# Patient Record
Sex: FEMALE | Race: WHITE | NOT HISPANIC OR LATINO | Employment: FULL TIME | URBAN - METROPOLITAN AREA
[De-identification: names, ages, dates, MRNs, and addresses within clinical notes are randomized per-mention and may not be internally consistent; named-entity substitution may affect disease eponyms.]

---

## 2023-03-06 LAB
ANION GAP IN SER/PLAS: 12 MMOL/L (ref 10–20)
BASOPHILS (10*3/UL) IN BLOOD BY AUTOMATED COUNT: 0.1 X10E9/L (ref 0–0.1)
BASOPHILS/100 LEUKOCYTES IN BLOOD BY AUTOMATED COUNT: 0.9 % (ref 0–2)
CALCIUM (MG/DL) IN SER/PLAS: 8.4 MG/DL (ref 8.6–10.3)
CARBON DIOXIDE, TOTAL (MMOL/L) IN SER/PLAS: 25 MMOL/L (ref 21–32)
CHLORIDE (MMOL/L) IN SER/PLAS: 109 MMOL/L (ref 98–107)
CREATININE (MG/DL) IN SER/PLAS: 0.81 MG/DL (ref 0.5–1.05)
EOSINOPHILS (10*3/UL) IN BLOOD BY AUTOMATED COUNT: 0.26 X10E9/L (ref 0–0.7)
EOSINOPHILS/100 LEUKOCYTES IN BLOOD BY AUTOMATED COUNT: 2.3 % (ref 0–6)
ERYTHROCYTE DISTRIBUTION WIDTH (RATIO) BY AUTOMATED COUNT: 13.1 % (ref 11.5–14.5)
ERYTHROCYTE MEAN CORPUSCULAR HEMOGLOBIN CONCENTRATION (G/DL) BY AUTOMATED: 32.4 G/DL (ref 32–36)
ERYTHROCYTE MEAN CORPUSCULAR VOLUME (FL) BY AUTOMATED COUNT: 88 FL (ref 80–100)
ERYTHROCYTES (10*6/UL) IN BLOOD BY AUTOMATED COUNT: 4.97 X10E12/L (ref 4–5.2)
GFR FEMALE: 82 ML/MIN/1.73M2
GLUCOSE (MG/DL) IN SER/PLAS: 67 MG/DL (ref 74–99)
HEMATOCRIT (%) IN BLOOD BY AUTOMATED COUNT: 43.8 % (ref 36–46)
HEMOGLOBIN (G/DL) IN BLOOD: 14.2 G/DL (ref 12–16)
IMMATURE GRANULOCYTES/100 LEUKOCYTES IN BLOOD BY AUTOMATED COUNT: 0.4 % (ref 0–0.9)
LEUKOCYTES (10*3/UL) IN BLOOD BY AUTOMATED COUNT: 11.2 X10E9/L (ref 4.4–11.3)
LYMPHOCYTES (10*3/UL) IN BLOOD BY AUTOMATED COUNT: 3.64 X10E9/L (ref 1.2–4.8)
LYMPHOCYTES/100 LEUKOCYTES IN BLOOD BY AUTOMATED COUNT: 32.4 % (ref 13–44)
MONOCYTES (10*3/UL) IN BLOOD BY AUTOMATED COUNT: 0.56 X10E9/L (ref 0.1–1)
MONOCYTES/100 LEUKOCYTES IN BLOOD BY AUTOMATED COUNT: 5 % (ref 2–10)
NEUTROPHILS (10*3/UL) IN BLOOD BY AUTOMATED COUNT: 6.62 X10E9/L (ref 1.2–7.7)
NEUTROPHILS/100 LEUKOCYTES IN BLOOD BY AUTOMATED COUNT: 59 % (ref 40–80)
PLATELETS (10*3/UL) IN BLOOD AUTOMATED COUNT: 361 X10E9/L (ref 150–450)
POTASSIUM (MMOL/L) IN SER/PLAS: 3.7 MMOL/L (ref 3.5–5.3)
SODIUM (MMOL/L) IN SER/PLAS: 142 MMOL/L (ref 136–145)
UREA NITROGEN (MG/DL) IN SER/PLAS: 6 MG/DL (ref 6–23)

## 2023-03-08 LAB — STAPH/MRSA SCREEN, CULTURE: NORMAL

## 2023-03-18 LAB — SARS-COV-2 RESULT: NOT DETECTED

## 2023-03-23 DIAGNOSIS — M54.16 LUMBAR RADICULOPATHY: Primary | ICD-10-CM

## 2023-03-23 RX ORDER — PREGABALIN 50 MG/1
50 CAPSULE ORAL 2 TIMES DAILY
COMMUNITY
Start: 2022-09-07 | End: 2023-03-23 | Stop reason: SDUPTHER

## 2023-03-23 RX ORDER — PREGABALIN 50 MG/1
50 CAPSULE ORAL 2 TIMES DAILY
Qty: 30 CAPSULE | Refills: 0 | Status: SHIPPED | OUTPATIENT
Start: 2023-03-23 | End: 2023-04-17 | Stop reason: SDUPTHER

## 2023-04-17 DIAGNOSIS — M54.16 LUMBAR RADICULOPATHY: ICD-10-CM

## 2023-04-17 RX ORDER — PREGABALIN 50 MG/1
50 CAPSULE ORAL 2 TIMES DAILY
Qty: 30 CAPSULE | Refills: 0 | Status: SHIPPED | OUTPATIENT
Start: 2023-04-17 | End: 2023-05-15 | Stop reason: SDUPTHER

## 2023-04-24 DIAGNOSIS — Z00.00 ROUTINE GENERAL MEDICAL EXAMINATION AT A HEALTH CARE FACILITY: Primary | ICD-10-CM

## 2023-04-24 RX ORDER — TRAZODONE HYDROCHLORIDE 100 MG/1
100 TABLET ORAL NIGHTLY
Qty: 90 TABLET | Refills: 0 | Status: SHIPPED | OUTPATIENT
Start: 2023-04-24 | End: 2023-10-02 | Stop reason: SDUPTHER

## 2023-04-24 RX ORDER — TRAZODONE HYDROCHLORIDE 100 MG/1
1 TABLET ORAL NIGHTLY
COMMUNITY
Start: 2019-01-31 | End: 2023-04-24 | Stop reason: SDUPTHER

## 2023-05-15 DIAGNOSIS — M54.16 LUMBAR RADICULOPATHY: ICD-10-CM

## 2023-05-15 RX ORDER — PREGABALIN 50 MG/1
50 CAPSULE ORAL 2 TIMES DAILY
Qty: 60 CAPSULE | Refills: 3 | Status: SHIPPED | OUTPATIENT
Start: 2023-05-15 | End: 2023-07-17 | Stop reason: SDUPTHER

## 2023-07-13 PROBLEM — F43.0 STRESS REACTION: Status: ACTIVE | Noted: 2023-07-13

## 2023-07-13 PROBLEM — N60.19 FIBROCYSTIC BREAST DISEASE (FCBD): Status: ACTIVE | Noted: 2023-07-13

## 2023-07-13 PROBLEM — M25.569 JOINT PAIN, KNEE: Status: ACTIVE | Noted: 2023-07-13

## 2023-07-13 PROBLEM — M19.079 ARTHRITIS OF MIDFOOT: Status: ACTIVE | Noted: 2023-07-13

## 2023-07-13 PROBLEM — M19.90 OSTEOARTHRITIS: Status: ACTIVE | Noted: 2023-07-13

## 2023-07-13 PROBLEM — R79.89 ELEVATED PARATHYROID HORMONE: Status: ACTIVE | Noted: 2023-07-13

## 2023-07-13 PROBLEM — R92.8 ABNORMAL MAMMOGRAM: Status: ACTIVE | Noted: 2023-07-13

## 2023-07-13 PROBLEM — F17.210 SMOKING GREATER THAN 30 PACK YEARS: Status: ACTIVE | Noted: 2023-07-13

## 2023-07-13 PROBLEM — M70.62 GREATER TROCHANTERIC BURSITIS OF LEFT HIP: Status: ACTIVE | Noted: 2023-07-13

## 2023-07-13 PROBLEM — M23.92 INTERNAL DERANGEMENT OF LEFT KNEE: Status: ACTIVE | Noted: 2023-07-13

## 2023-07-13 PROBLEM — M25.551 HIP PAIN, RIGHT: Status: ACTIVE | Noted: 2023-07-13

## 2023-07-13 PROBLEM — M79.18 MYOFASCIAL PAIN SYNDROME: Status: ACTIVE | Noted: 2023-07-13

## 2023-07-13 PROBLEM — M47.816 LUMBAR SPONDYLOSIS: Status: ACTIVE | Noted: 2023-07-13

## 2023-07-13 PROBLEM — Z96.642 STATUS POST LEFT HIP REPLACEMENT: Status: ACTIVE | Noted: 2023-07-13

## 2023-07-13 PROBLEM — M25.562 LEFT KNEE PAIN: Status: ACTIVE | Noted: 2023-07-13

## 2023-07-13 PROBLEM — M15.9 OSTEOARTHRITIS OF MULTIPLE JOINTS: Status: ACTIVE | Noted: 2023-07-13

## 2023-07-13 PROBLEM — G56.00 CARPAL TUNNEL SYNDROME: Status: ACTIVE | Noted: 2023-07-13

## 2023-07-13 PROBLEM — L98.9 SKIN LESION: Status: ACTIVE | Noted: 2023-07-13

## 2023-07-13 PROBLEM — R20.0 THIGH NUMBNESS: Status: ACTIVE | Noted: 2023-07-13

## 2023-07-13 PROBLEM — F41.9 ANXIETY: Status: ACTIVE | Noted: 2023-07-13

## 2023-07-13 PROBLEM — E55.9 VITAMIN D DEFICIENCY: Status: ACTIVE | Noted: 2023-07-13

## 2023-07-13 PROBLEM — M46.1 SACROILIITIS (CMS-HCC): Status: ACTIVE | Noted: 2023-07-13

## 2023-07-13 PROBLEM — M16.10 PRIMARY LOCALIZED OSTEOARTHROSIS OF PELVIC REGION: Status: ACTIVE | Noted: 2023-07-13

## 2023-07-13 PROBLEM — M70.60 TROCHANTERIC BURSITIS: Status: ACTIVE | Noted: 2023-07-13

## 2023-07-13 PROBLEM — D72.829 ELEVATED WHITE BLOOD CELL COUNT: Status: ACTIVE | Noted: 2023-07-13

## 2023-07-13 PROBLEM — M72.2 PLANTAR FASCIITIS, RIGHT: Status: ACTIVE | Noted: 2023-07-13

## 2023-07-13 PROBLEM — M12.9 ARTHROPATHY: Status: ACTIVE | Noted: 2023-07-13

## 2023-07-13 PROBLEM — M19.079 PRIMARY OSTEOARTHRITIS, UNSPECIFIED ANKLE AND FOOT: Status: ACTIVE | Noted: 2023-07-13

## 2023-07-13 PROBLEM — I10 BENIGN ESSENTIAL HYPERTENSION: Status: ACTIVE | Noted: 2023-07-13

## 2023-07-13 PROBLEM — M84.48XA SACRAL INSUFFICIENCY FRACTURE: Status: ACTIVE | Noted: 2023-07-13

## 2023-07-13 PROBLEM — M54.9 BACK PAIN: Status: ACTIVE | Noted: 2023-07-13

## 2023-07-13 PROBLEM — M17.12 PRIMARY LOCALIZED OSTEOARTHRITIS OF LEFT KNEE: Status: ACTIVE | Noted: 2023-07-13

## 2023-07-13 PROBLEM — M81.0 OSTEOPOROSIS: Status: ACTIVE | Noted: 2023-07-13

## 2023-07-13 PROBLEM — M54.16 LUMBAR RADICULITIS: Status: ACTIVE | Noted: 2023-07-13

## 2023-07-13 PROBLEM — R74.8 ELEVATED ALKALINE PHOSPHATASE LEVEL: Status: ACTIVE | Noted: 2023-07-13

## 2023-07-13 PROBLEM — J44.9 CHRONIC OBSTRUCTIVE PULMONARY DISEASE (MULTI): Status: ACTIVE | Noted: 2023-07-13

## 2023-07-13 PROBLEM — R10.9 RIGHT FLANK PAIN: Status: ACTIVE | Noted: 2023-07-13

## 2023-07-13 PROBLEM — M79.671 RIGHT FOOT PAIN: Status: ACTIVE | Noted: 2023-07-13

## 2023-07-13 PROBLEM — M54.50 LOW BACK PAIN: Status: ACTIVE | Noted: 2023-07-13

## 2023-07-13 PROBLEM — G47.00 INSOMNIA: Status: ACTIVE | Noted: 2023-07-13

## 2023-07-13 PROBLEM — S83.90XA KNEE SPRAIN: Status: ACTIVE | Noted: 2023-07-13

## 2023-07-13 PROBLEM — N39.0 ACUTE UTI: Status: ACTIVE | Noted: 2023-07-13

## 2023-07-17 ENCOUNTER — OFFICE VISIT (OUTPATIENT)
Dept: PRIMARY CARE | Facility: CLINIC | Age: 63
End: 2023-07-17
Payer: COMMERCIAL

## 2023-07-17 VITALS
SYSTOLIC BLOOD PRESSURE: 127 MMHG | HEART RATE: 79 BPM | WEIGHT: 179.4 LBS | DIASTOLIC BLOOD PRESSURE: 81 MMHG | TEMPERATURE: 98 F | OXYGEN SATURATION: 96 % | BODY MASS INDEX: 30.63 KG/M2 | HEIGHT: 64 IN

## 2023-07-17 DIAGNOSIS — M54.6 BILATERAL THORACIC BACK PAIN, UNSPECIFIED CHRONICITY: Primary | ICD-10-CM

## 2023-07-17 DIAGNOSIS — M54.16 LUMBAR RADICULOPATHY: ICD-10-CM

## 2023-07-17 PROBLEM — M25.559 ARTHRALGIA OF HIP: Status: ACTIVE | Noted: 2023-07-17

## 2023-07-17 PROBLEM — M17.10 PRIMARY LOCALIZED OSTEOARTHRITIS OF KNEE: Status: ACTIVE | Noted: 2023-07-17

## 2023-07-17 PROBLEM — M54.50 LOWER BACK PAIN: Status: ACTIVE | Noted: 2023-07-17

## 2023-07-17 PROCEDURE — 3074F SYST BP LT 130 MM HG: CPT | Performed by: INTERNAL MEDICINE

## 2023-07-17 PROCEDURE — 3008F BODY MASS INDEX DOCD: CPT | Performed by: INTERNAL MEDICINE

## 2023-07-17 PROCEDURE — 99214 OFFICE O/P EST MOD 30 MIN: CPT | Performed by: INTERNAL MEDICINE

## 2023-07-17 PROCEDURE — 3079F DIAST BP 80-89 MM HG: CPT | Performed by: INTERNAL MEDICINE

## 2023-07-17 RX ORDER — ALENDRONATE SODIUM 70 MG/1
70 TABLET ORAL
COMMUNITY
Start: 2022-05-18 | End: 2024-01-22 | Stop reason: SDUPTHER

## 2023-07-17 RX ORDER — PREGABALIN 75 MG/1
75 CAPSULE ORAL 2 TIMES DAILY
Qty: 60 CAPSULE | Refills: 3 | Status: SHIPPED | OUTPATIENT
Start: 2023-07-17 | End: 2024-02-05 | Stop reason: SDUPTHER

## 2023-07-17 RX ORDER — TRAMADOL HYDROCHLORIDE 50 MG/1
50 TABLET ORAL
COMMUNITY
Start: 2023-04-05 | End: 2023-10-02 | Stop reason: SDUPTHER

## 2023-07-17 RX ORDER — PHENYLPROPANOLAMINE/CLEMASTINE 75-1.34MG
200 TABLET, EXTENDED RELEASE ORAL AS NEEDED
COMMUNITY
End: 2023-07-17 | Stop reason: ALTCHOICE

## 2023-07-17 RX ORDER — IBUPROFEN 800 MG/1
800 TABLET ORAL EVERY 8 HOURS PRN
COMMUNITY

## 2023-07-17 RX ORDER — LIDOCAINE 50 MG/G
1 PATCH TOPICAL DAILY
COMMUNITY
Start: 2022-08-17

## 2023-07-17 RX ORDER — METHYLPREDNISOLONE 4 MG/1
4 TABLET ORAL
COMMUNITY
Start: 2023-07-12 | End: 2024-03-25 | Stop reason: ALTCHOICE

## 2023-07-17 ASSESSMENT — PATIENT HEALTH QUESTIONNAIRE - PHQ9
SUM OF ALL RESPONSES TO PHQ9 QUESTIONS 1 AND 2: 0
2. FEELING DOWN, DEPRESSED OR HOPELESS: NOT AT ALL
1. LITTLE INTEREST OR PLEASURE IN DOING THINGS: NOT AT ALL

## 2023-07-17 ASSESSMENT — ENCOUNTER SYMPTOMS
BACK PAIN: 1
SLEEP DISTURBANCE: 1

## 2023-07-17 NOTE — PROGRESS NOTES
Subjective   Patient ID: Esmer Upton is a 63 y.o. female who presents for 6 month follow up.    The patient mentions back pain in the thoracic area, particularly on sneezing, since about 7/3/2023.  She recalls an insufficiency fracture in the lumbar region on MRI in 2021.  She has restarted Ibuprofen, and takes 800 mg nightly.  She has stopped taking Aleve as this has not been helping.  She has also been taking pregabalin 50 mg twice daily and has not noticed much benefit.  She does think it was helpful when she first started this.  The patient continues to take Alendronate 70 mg as 1 tablet every week as prescribed.    The patient underwent left total hip replacement surgery in 3/2023 and continues to follow with  from Orthopedic Surgery.  Overall, she feels as though her condition has improved since the procedure.  She recalls a fracture in the left hip joint that was repaired with surgical pinning in 2012 as well.     The patient reports left knee pain and swelling.  She received a corticosteroid and vicosupplementation injection for this joint in 6/2023 which was minimally beneficial.       The patient mentions bilateral foot pain, particularly on the right side.  She has been applying lidocaine patches to the area which has been helping.    The patient reports ongoing sleep disturbance despite taking trazodone 100 mg once nightly, and inquires whether an increased dosage is advisable.    The patient has an upcoming long car ride with her boyfriend to visit her son in [place], and she is concerned about possible blood clotting.      Review of Systems   Musculoskeletal:  Positive for back pain.        Positive for left knee pain and swelling, left hip pain, and bilateral foot pain.   Psychiatric/Behavioral:  Positive for sleep disturbance.      Objective   Physical Exam  Constitutional:       Appearance: Normal appearance.   Cardiovascular:      Rate and Rhythm: Normal rate and regular  rhythm.      Heart sounds: Normal heart sounds.   Pulmonary:      Effort: Pulmonary effort is normal.      Breath sounds: Normal breath sounds.   Abdominal:      General: Bowel sounds are normal.      Palpations: Abdomen is soft.      Tenderness: There is no abdominal tenderness.   Skin:     General: Skin is warm and dry.   Neurological:      General: No focal deficit present.      Mental Status: She is alert and oriented to person, place, and time. Mental status is at baseline.   Psychiatric:         Mood and Affect: Mood normal.         Behavior: Behavior normal.       Assessment/Plan   Problem List Items Addressed This Visit       Back pain - Primary    Relevant Orders    XR thoracic spine 2 views     Other Visit Diagnoses       Lumbar radiculopathy        Relevant Medications    pregabalin (Lyrica) 75 mg capsule            IMPRESSION/PLAN:     Prolonged Car Trip  - Advised the patient to take ASA 81 mg for one week prior, and one week after the trip.    Bilateral Thoracic Back Pain  - Ordered Xray Thoracic Spine.  Patient okay to try applying lidocaine patches to the area. Increased pregabalin to 75 mg twice daily as below.  Ortho prescribed tramadol 50 mg up to TID as needed.  She will use sparingly.  Advised can cause drowsiness if mixing.  OARRS reviewed. Substance controlled contract signed.     Right foot and Bilateral Hip pain   - Increase pregabalin 75 mg BID and advised to limit Ibuprofen as much as possible to prevent adverse effects. Following with  in Orthopedic Surgery.    Insomnia   - Symptoms ongoing.  Advised the patient to try Sleep 3 Nature's Bounty over the counter.  If this does not work, can increase trazodone 100mg 1.5 tablets to be taken QHS one week AFTER starting the increased dosage for pregabalin.      /81 in the office today.     Lumbar Radiculopathy  - Followed with Dr. Liang in Orthopedics, and Dr. Schneider in Pain Medicine.      Left Hip Arthritis   - s/p Total hip  replacement in 3/2023.    Osteoporosis   - Continue with Alendronate 70 mg as 1 tablet every week 30 to 60 minutes before breakfast on an empty stomach. Do not lie down after taking medication.     Carpel Tunnel   - Stable. Continue wearing wrist splints to immobilize while sleeping. Call if symptoms worsen.      History of Smoking   - Last CT Chest 12/2022 showed few stable small bilateral noncalcified pulmonary nodules measuring up to 6 mm, likely benign.  Repeat due 12/2023.     Health Maintenance   - Routine labs 3/2023. Last PAP 6/2021. Last Mammogram 12/2022. Last colonoscopy performed 12/2022, repeat due 12/2025.     Follow up according to routine health maintenance, call sooner if needed.       Scribe Attestation  By signing my name below, I, Poornima Davidson   attest that this documentation has been prepared under the direction and in the presence of Papo Duong DO.

## 2023-10-02 ENCOUNTER — OFFICE VISIT (OUTPATIENT)
Dept: PRIMARY CARE | Facility: CLINIC | Age: 63
End: 2023-10-02
Payer: COMMERCIAL

## 2023-10-02 VITALS
DIASTOLIC BLOOD PRESSURE: 70 MMHG | HEART RATE: 74 BPM | OXYGEN SATURATION: 98 % | RESPIRATION RATE: 16 BRPM | SYSTOLIC BLOOD PRESSURE: 128 MMHG | TEMPERATURE: 97.5 F | HEIGHT: 64 IN | BODY MASS INDEX: 30.9 KG/M2 | WEIGHT: 181 LBS

## 2023-10-02 DIAGNOSIS — I10 BENIGN ESSENTIAL HYPERTENSION: ICD-10-CM

## 2023-10-02 DIAGNOSIS — Z00.00 ROUTINE GENERAL MEDICAL EXAMINATION AT A HEALTH CARE FACILITY: ICD-10-CM

## 2023-10-02 DIAGNOSIS — Z12.31 ENCOUNTER FOR SCREENING MAMMOGRAM FOR MALIGNANT NEOPLASM OF BREAST: ICD-10-CM

## 2023-10-02 DIAGNOSIS — Z00.00 HEALTHCARE MAINTENANCE: Primary | ICD-10-CM

## 2023-10-02 DIAGNOSIS — J44.9 CHRONIC OBSTRUCTIVE PULMONARY DISEASE, UNSPECIFIED COPD TYPE (MULTI): ICD-10-CM

## 2023-10-02 DIAGNOSIS — F17.210 SMOKING GREATER THAN 30 PACK YEARS: ICD-10-CM

## 2023-10-02 DIAGNOSIS — M54.16 LUMBAR RADICULITIS: ICD-10-CM

## 2023-10-02 DIAGNOSIS — M15.9 OSTEOARTHRITIS OF MULTIPLE JOINTS, UNSPECIFIED OSTEOARTHRITIS TYPE: ICD-10-CM

## 2023-10-02 PROCEDURE — 99396 PREV VISIT EST AGE 40-64: CPT | Performed by: INTERNAL MEDICINE

## 2023-10-02 PROCEDURE — 3078F DIAST BP <80 MM HG: CPT | Performed by: INTERNAL MEDICINE

## 2023-10-02 PROCEDURE — 3074F SYST BP LT 130 MM HG: CPT | Performed by: INTERNAL MEDICINE

## 2023-10-02 PROCEDURE — 3008F BODY MASS INDEX DOCD: CPT | Performed by: INTERNAL MEDICINE

## 2023-10-02 RX ORDER — TRAZODONE HYDROCHLORIDE 100 MG/1
100 TABLET ORAL NIGHTLY
Qty: 90 TABLET | Refills: 3 | Status: SHIPPED | OUTPATIENT
Start: 2023-10-02

## 2023-10-02 RX ORDER — TRAMADOL HYDROCHLORIDE 50 MG/1
50 TABLET ORAL EVERY 8 HOURS PRN
Qty: 21 TABLET | Refills: 0 | Status: SHIPPED | OUTPATIENT
Start: 2023-10-02 | End: 2024-03-25 | Stop reason: ALTCHOICE

## 2023-10-02 ASSESSMENT — PROMIS GLOBAL HEALTH SCALE
EMOTIONAL_PROBLEMS: SOMETIMES
RATE_AVERAGE_PAIN: 5
CARRYOUT_PHYSICAL_ACTIVITIES: MOSTLY
CARRYOUT_SOCIAL_ACTIVITIES: GOOD
RATE_AVERAGE_FATIGUE: MILD
RATE_PHYSICAL_HEALTH: FAIR
RATE_QUALITY_OF_LIFE: FAIR
RATE_SOCIAL_SATISFACTION: VERY GOOD
RATE_GENERAL_HEALTH: GOOD
RATE_MENTAL_HEALTH: GOOD

## 2023-10-02 ASSESSMENT — PATIENT HEALTH QUESTIONNAIRE - PHQ9
1. LITTLE INTEREST OR PLEASURE IN DOING THINGS: NOT AT ALL
2. FEELING DOWN, DEPRESSED OR HOPELESS: NOT AT ALL
SUM OF ALL RESPONSES TO PHQ9 QUESTIONS 1 AND 2: 0

## 2023-10-02 ASSESSMENT — ENCOUNTER SYMPTOMS
BLOOD IN STOOL: 0
CONSTIPATION: 0
SLEEP DISTURBANCE: 1
DIARRHEA: 0
BACK PAIN: 1
ABDOMINAL PAIN: 0

## 2023-10-02 NOTE — PROGRESS NOTES
Patient here for a physical     Subjective   Patient ID: Esmer Upton is a 63 y.o. female who presents for Annual Exam.    The patient reports progressively worsening thoracic back pain due to osteoarthritis.  She continues to follow with  from Orthopedics-Spine.    The patient mentions ongoing midfoot pain and notes partial relief with pregabalin 75mg twice daily.  She was recently given a prescription for tramadol 50 mg up to three times daily as needed for a trip to see her son, and found this to be quite helpful.  She continues to follow with  from Orthopedic Surgery.      The patient reports ongoing insomnia despite trying Sleep 3 Melatonin.  She has been able to sleep up to 2 hours at a time in short bursts.    The patient denies any hearing impairment, abdominal pain, hematochezia, melena, or bowel problems.    The patient is up to date with pap smear testing.  She has a history of endometrial ablation in 6/2021, and has not experienced vaginal bleeding since that time.    The patient regularly follows up Optometry and plans to schedule an appointment with an Ophthalmologist soon.      The patient continues to smoke cigarettes.        Review of Systems   HENT:  Negative for hearing loss.    Gastrointestinal:  Negative for abdominal pain, blood in stool, constipation and diarrhea.   Musculoskeletal:  Positive for back pain.        Positive for midfoot pain.   Psychiatric/Behavioral:  Positive for sleep disturbance.        Objective   Physical Exam  Constitutional:       Appearance: Normal appearance.   Neck:      Vascular: No carotid bruit.   Cardiovascular:      Rate and Rhythm: Normal rate and regular rhythm.      Heart sounds: Normal heart sounds.   Pulmonary:      Effort: Pulmonary effort is normal.      Breath sounds: Normal breath sounds.   Abdominal:      General: Bowel sounds are normal.      Palpations: Abdomen is soft.      Tenderness: There is no abdominal tenderness.    Skin:     General: Skin is warm and dry.   Neurological:      General: No focal deficit present.      Mental Status: She is alert and oriented to person, place, and time. Mental status is at baseline.   Psychiatric:         Mood and Affect: Mood normal.         Behavior: Behavior normal.       Assessment/Plan   Problem List Items Addressed This Visit             ICD-10-CM    Benign essential hypertension I10    Chronic obstructive pulmonary disease (CMS/HCC) J44.9    Lumbar radiculitis M54.16    Osteoarthritis of multiple joints M15.9    Relevant Medications    traMADol (Ultram) 50 mg tablet    Other Relevant Orders    Vitamin D 25-Hydroxy,Total (for eval of Vitamin D levels)    Smoking greater than 30 pack years F17.210    Relevant Orders    CT lung screening low dose     Other Visit Diagnoses         Codes    Healthcare maintenance    -  Primary Z00.00    Relevant Orders    CBC and Auto Differential    Comprehensive metabolic panel    Lipid panel    Tsh With Reflex To Free T4 If Abnormal    Encounter for screening mammogram for malignant neoplasm of breast     Z12.31    Relevant Orders    BI mammo bilateral screening tomosynthesis    Routine general medical examination at a health care facility     Z00.00    Relevant Medications    traZODone (Desyrel) 100 mg tablet            CPE Performed  -  Discussed healthy diet and regular exercise.    -  Physical exam overall unremarkable. Immunizations reviewed and updated accordingly. Healthy lifestyle choices discussed (tobacco avoidance, appropriate alcohol use, avoidance of illicit substances).   -  Patient is wearing seatbelt.   -  Screening lab work ordered as indicated.    -  Age appropriate screening tests reviewed with patient.        IMPRESSION/PLAN:     History of Smoking   - Last CT Chest 12/2022 showed few stable small bilateral noncalcified pulmonary nodules measuring up to 6 mm, likely benign.  Ordered repeat for 2023.    Bilateral Thoracic Back Pain  -  Worsening per last Xray 7/2023.  Advised patient to follow-up with  from Orthopedics-Spine or  from Neurosurgery soon.  In the meantime, refilled tramadol 50 mg up to TID as needed.  She will use sparingly. Advised can cause drowsiness if mixing.  OARRS reviewed. Substance controlled contract signed.  Patient also okay to try applying lidocaine patches to the area and continue with pregabalin to 75 mg twice daily as below.     Insomnia   - Symptoms ongoing.  Advised the patient to try Sleep 3 Nature's Bounty over the counter.  Refilled trazodone 100mg at bedtime.    /70 in the office today.     Lumbar Radiculopathy  - Followed with Dr. Liang in Orthopedics, and Dr. Schneider in Pain Medicine.       Left Hip Arthritis   - s/p Total hip replacement in 3/2023.     Osteoporosis   - Continue with Alendronate 70 mg as 1 tablet every week 30 to 60 minutes before breakfast on an empty stomach. Do not lie down after taking medication.     Carpel Tunnel   - Stable. Continue wearing wrist splints to immobilize while sleeping. Call if symptoms worsen.      Right foot and Bilateral Hip pain   - Increase pregabalin 75 mg BID and advised to limit Ibuprofen as much as possible to prevent adverse effects. Following with  in Orthopedic Surgery.     Health Maintenance   - Routine labs ordered including CBC, CMP, and a lipid panel to be completed in the fasting state.  Added TSH, Vitamin D.  Last PAP 6/2021. Last Mammogram 12/2022, ordered repeat for 2023. Last colonoscopy performed 12/2022, repeat due 12/2025.     Follow up according to routine health maintenance, call sooner if needed.       Scribe Attestation  By signing my name below, I, Annetta Linda, Poornima   attest that this documentation has been prepared under the direction and in the presence of Papo Duong DO.

## 2023-10-17 ENCOUNTER — APPOINTMENT (OUTPATIENT)
Dept: PRIMARY CARE | Facility: CLINIC | Age: 63
End: 2023-10-17
Payer: COMMERCIAL

## 2023-10-26 ENCOUNTER — LAB (OUTPATIENT)
Dept: LAB | Facility: LAB | Age: 63
End: 2023-10-26
Payer: COMMERCIAL

## 2023-10-26 ENCOUNTER — OFFICE VISIT (OUTPATIENT)
Dept: ORTHOPEDIC SURGERY | Facility: CLINIC | Age: 63
End: 2023-10-26
Payer: COMMERCIAL

## 2023-10-26 ENCOUNTER — ANCILLARY PROCEDURE (OUTPATIENT)
Dept: RADIOLOGY | Facility: CLINIC | Age: 63
End: 2023-10-26
Payer: COMMERCIAL

## 2023-10-26 DIAGNOSIS — M19.079 PRIMARY OSTEOARTHRITIS, UNSPECIFIED ANKLE AND FOOT: ICD-10-CM

## 2023-10-26 DIAGNOSIS — Z96.642 STATUS POST LEFT HIP REPLACEMENT: ICD-10-CM

## 2023-10-26 DIAGNOSIS — M15.9 OSTEOARTHRITIS OF MULTIPLE JOINTS, UNSPECIFIED OSTEOARTHRITIS TYPE: ICD-10-CM

## 2023-10-26 DIAGNOSIS — Z00.00 HEALTHCARE MAINTENANCE: ICD-10-CM

## 2023-10-26 DIAGNOSIS — M15.9 POLYOSTEOARTHRITIS, UNSPECIFIED: Primary | ICD-10-CM

## 2023-10-26 DIAGNOSIS — Z96.642 STATUS POST LEFT HIP REPLACEMENT: Primary | ICD-10-CM

## 2023-10-26 LAB
25(OH)D3 SERPL-MCNC: 15 NG/ML (ref 30–100)
ALBUMIN SERPL BCP-MCNC: 4.2 G/DL (ref 3.4–5)
ALP SERPL-CCNC: 98 U/L (ref 33–136)
ALT SERPL W P-5'-P-CCNC: 9 U/L (ref 7–45)
ANION GAP SERPL CALC-SCNC: 14 MMOL/L (ref 10–20)
AST SERPL W P-5'-P-CCNC: 13 U/L (ref 9–39)
BASOPHILS # BLD AUTO: 0.08 X10*3/UL (ref 0–0.1)
BASOPHILS NFR BLD AUTO: 0.6 %
BILIRUB SERPL-MCNC: 0.4 MG/DL (ref 0–1.2)
BUN SERPL-MCNC: 11 MG/DL (ref 6–23)
CALCIUM SERPL-MCNC: 9.9 MG/DL (ref 8.6–10.6)
CCP IGG SERPL-ACNC: <1 U/ML
CHLORIDE SERPL-SCNC: 108 MMOL/L (ref 98–107)
CHOLEST SERPL-MCNC: 212 MG/DL (ref 0–199)
CHOLESTEROL/HDL RATIO: 2.9
CO2 SERPL-SCNC: 24 MMOL/L (ref 21–32)
CREAT SERPL-MCNC: 0.82 MG/DL (ref 0.5–1.05)
CRP SERPL-MCNC: 0.11 MG/DL
EOSINOPHIL # BLD AUTO: 0.18 X10*3/UL (ref 0–0.7)
EOSINOPHIL NFR BLD AUTO: 1.3 %
ERYTHROCYTE [DISTWIDTH] IN BLOOD BY AUTOMATED COUNT: 14.3 % (ref 11.5–14.5)
ERYTHROCYTE [SEDIMENTATION RATE] IN BLOOD BY WESTERGREN METHOD: 14 MM/H (ref 0–30)
GFR SERPL CREATININE-BSD FRML MDRD: 81 ML/MIN/1.73M*2
GLUCOSE SERPL-MCNC: 92 MG/DL (ref 74–99)
HCT VFR BLD AUTO: 46.7 % (ref 36–46)
HDLC SERPL-MCNC: 74 MG/DL
HGB BLD-MCNC: 14.8 G/DL (ref 12–16)
IMM GRANULOCYTES # BLD AUTO: 0.08 X10*3/UL (ref 0–0.7)
IMM GRANULOCYTES NFR BLD AUTO: 0.6 % (ref 0–0.9)
LDLC SERPL CALC-MCNC: 123 MG/DL
LYMPHOCYTES # BLD AUTO: 3.38 X10*3/UL (ref 1.2–4.8)
LYMPHOCYTES NFR BLD AUTO: 24.4 %
MCH RBC QN AUTO: 29.7 PG (ref 26–34)
MCHC RBC AUTO-ENTMCNC: 31.7 G/DL (ref 32–36)
MCV RBC AUTO: 94 FL (ref 80–100)
MONOCYTES # BLD AUTO: 0.84 X10*3/UL (ref 0.1–1)
MONOCYTES NFR BLD AUTO: 6.1 %
NEUTROPHILS # BLD AUTO: 9.31 X10*3/UL (ref 1.2–7.7)
NEUTROPHILS NFR BLD AUTO: 67 %
NON HDL CHOLESTEROL: 138 MG/DL (ref 0–149)
NRBC BLD-RTO: 0 /100 WBCS (ref 0–0)
PLATELET # BLD AUTO: 420 X10*3/UL (ref 150–450)
PMV BLD AUTO: 9.6 FL (ref 7.5–11.5)
POTASSIUM SERPL-SCNC: 4.2 MMOL/L (ref 3.5–5.3)
PROT SERPL-MCNC: 6.7 G/DL (ref 6.4–8.2)
RBC # BLD AUTO: 4.98 X10*6/UL (ref 4–5.2)
RHEUMATOID FACT SER NEPH-ACNC: <10 IU/ML (ref 0–15)
SODIUM SERPL-SCNC: 142 MMOL/L (ref 136–145)
TRIGL SERPL-MCNC: 73 MG/DL (ref 0–149)
TSH SERPL-ACNC: 2.9 MIU/L (ref 0.44–3.98)
VLDL: 15 MG/DL (ref 0–40)
WBC # BLD AUTO: 13.9 X10*3/UL (ref 4.4–11.3)

## 2023-10-26 PROCEDURE — 3008F BODY MASS INDEX DOCD: CPT | Performed by: ORTHOPAEDIC SURGERY

## 2023-10-26 PROCEDURE — 99213 OFFICE O/P EST LOW 20 MIN: CPT | Performed by: ORTHOPAEDIC SURGERY

## 2023-10-26 PROCEDURE — 82306 VITAMIN D 25 HYDROXY: CPT

## 2023-10-26 PROCEDURE — 84443 ASSAY THYROID STIM HORMONE: CPT

## 2023-10-26 PROCEDURE — 86431 RHEUMATOID FACTOR QUANT: CPT

## 2023-10-26 PROCEDURE — 86140 C-REACTIVE PROTEIN: CPT

## 2023-10-26 PROCEDURE — 80053 COMPREHEN METABOLIC PANEL: CPT

## 2023-10-26 PROCEDURE — 85025 COMPLETE CBC W/AUTO DIFF WBC: CPT

## 2023-10-26 PROCEDURE — 73502 X-RAY EXAM HIP UNI 2-3 VIEWS: CPT | Mod: LEFT SIDE | Performed by: RADIOLOGY

## 2023-10-26 PROCEDURE — 80061 LIPID PANEL: CPT

## 2023-10-26 PROCEDURE — 73502 X-RAY EXAM HIP UNI 2-3 VIEWS: CPT | Mod: LT,FY

## 2023-10-26 PROCEDURE — 85652 RBC SED RATE AUTOMATED: CPT

## 2023-10-26 PROCEDURE — 86200 CCP ANTIBODY: CPT

## 2023-10-26 RX ORDER — METHYLPREDNISOLONE 4 MG/1
4 TABLET ORAL ONCE
Qty: 1 TABLET | Refills: 0 | Status: SHIPPED | OUTPATIENT
Start: 2023-10-26 | End: 2023-10-26

## 2023-10-26 NOTE — PROGRESS NOTES
Atraumatic onset of left hip pain over the weekend.  Denies any falls.  Pain with walking.  Pain across the groin.  No locking or catching.  No fevers or chills.  No recent dental procedure.    Exam: Good flexibility left hip.  Pain with passive rotation.  Good strength.    I personally reviewed the following radiographic exams: Left hip shows well fixed well aligned total hip.  No change in previous film.    Assessment: Left hip pain status post remote total hip.    Plan: Do not see anything acute.  We will rule out anything infectious with blood work today.  We will place on a Medrol Dosepak.  We will keep her off work for few days.  Discussed possible CT scan to look for fluid if pain continues.

## 2023-10-27 DIAGNOSIS — E55.9 VITAMIN D DEFICIENCY: ICD-10-CM

## 2023-10-27 DIAGNOSIS — D72.829 LEUKOCYTOSIS, UNSPECIFIED TYPE: Primary | ICD-10-CM

## 2023-10-27 RX ORDER — ERGOCALCIFEROL 1.25 MG/1
50000 CAPSULE ORAL
Qty: 12 CAPSULE | Refills: 0 | Status: SHIPPED | OUTPATIENT
Start: 2023-10-27 | End: 2024-01-19

## 2023-12-27 ENCOUNTER — ANCILLARY PROCEDURE (OUTPATIENT)
Dept: RADIOLOGY | Facility: CLINIC | Age: 63
End: 2023-12-27
Payer: COMMERCIAL

## 2023-12-27 ENCOUNTER — LAB (OUTPATIENT)
Dept: LAB | Facility: LAB | Age: 63
End: 2023-12-27
Payer: COMMERCIAL

## 2023-12-27 DIAGNOSIS — F17.210 SMOKING GREATER THAN 30 PACK YEARS: ICD-10-CM

## 2023-12-27 DIAGNOSIS — D72.829 LEUKOCYTOSIS, UNSPECIFIED TYPE: ICD-10-CM

## 2023-12-27 DIAGNOSIS — Z12.31 ENCOUNTER FOR SCREENING MAMMOGRAM FOR MALIGNANT NEOPLASM OF BREAST: ICD-10-CM

## 2023-12-27 LAB
BASOPHILS # BLD AUTO: 0.11 X10*3/UL (ref 0–0.1)
BASOPHILS NFR BLD AUTO: 0.9 %
EOSINOPHIL # BLD AUTO: 0.28 X10*3/UL (ref 0–0.7)
EOSINOPHIL NFR BLD AUTO: 2.4 %
ERYTHROCYTE [DISTWIDTH] IN BLOOD BY AUTOMATED COUNT: 13.3 % (ref 11.5–14.5)
HCT VFR BLD AUTO: 46.7 % (ref 36–46)
HGB BLD-MCNC: 15.1 G/DL (ref 12–16)
IMM GRANULOCYTES # BLD AUTO: 0.04 X10*3/UL (ref 0–0.7)
IMM GRANULOCYTES NFR BLD AUTO: 0.3 % (ref 0–0.9)
LYMPHOCYTES # BLD AUTO: 3.16 X10*3/UL (ref 1.2–4.8)
LYMPHOCYTES NFR BLD AUTO: 26.7 %
MCH RBC QN AUTO: 30 PG (ref 26–34)
MCHC RBC AUTO-ENTMCNC: 32.3 G/DL (ref 32–36)
MCV RBC AUTO: 93 FL (ref 80–100)
MONOCYTES # BLD AUTO: 0.75 X10*3/UL (ref 0.1–1)
MONOCYTES NFR BLD AUTO: 6.3 %
NEUTROPHILS # BLD AUTO: 7.51 X10*3/UL (ref 1.2–7.7)
NEUTROPHILS NFR BLD AUTO: 63.4 %
NRBC BLD-RTO: 0 /100 WBCS (ref 0–0)
PLATELET # BLD AUTO: 433 X10*3/UL (ref 150–450)
RBC # BLD AUTO: 5.04 X10*6/UL (ref 4–5.2)
WBC # BLD AUTO: 11.9 X10*3/UL (ref 4.4–11.3)

## 2023-12-27 PROCEDURE — 85025 COMPLETE CBC W/AUTO DIFF WBC: CPT

## 2023-12-27 PROCEDURE — 71271 CT THORAX LUNG CANCER SCR C-: CPT | Performed by: RADIOLOGY

## 2023-12-27 PROCEDURE — 77067 SCR MAMMO BI INCL CAD: CPT | Mod: BILATERAL PROCEDURE | Performed by: RADIOLOGY

## 2023-12-27 PROCEDURE — 77063 BREAST TOMOSYNTHESIS BI: CPT | Mod: BILATERAL PROCEDURE | Performed by: RADIOLOGY

## 2023-12-27 PROCEDURE — 36415 COLL VENOUS BLD VENIPUNCTURE: CPT

## 2023-12-27 PROCEDURE — 71271 CT THORAX LUNG CANCER SCR C-: CPT

## 2023-12-27 PROCEDURE — 77067 SCR MAMMO BI INCL CAD: CPT

## 2023-12-28 DIAGNOSIS — D72.829 LEUKOCYTOSIS, UNSPECIFIED TYPE: Primary | ICD-10-CM

## 2024-01-22 ENCOUNTER — LAB (OUTPATIENT)
Dept: LAB | Facility: LAB | Age: 64
End: 2024-01-22
Payer: COMMERCIAL

## 2024-01-22 ENCOUNTER — OFFICE VISIT (OUTPATIENT)
Dept: RHEUMATOLOGY | Facility: CLINIC | Age: 64
End: 2024-01-22
Payer: COMMERCIAL

## 2024-01-22 VITALS
WEIGHT: 188 LBS | HEART RATE: 76 BPM | SYSTOLIC BLOOD PRESSURE: 121 MMHG | BODY MASS INDEX: 33.31 KG/M2 | DIASTOLIC BLOOD PRESSURE: 78 MMHG | HEIGHT: 63 IN

## 2024-01-22 DIAGNOSIS — D72.829 LEUKOCYTOSIS, UNSPECIFIED TYPE: ICD-10-CM

## 2024-01-22 DIAGNOSIS — M81.0 OSTEOPOROSIS, UNSPECIFIED OSTEOPOROSIS TYPE, UNSPECIFIED PATHOLOGICAL FRACTURE PRESENCE: ICD-10-CM

## 2024-01-22 DIAGNOSIS — M81.0 OSTEOPOROSIS, UNSPECIFIED OSTEOPOROSIS TYPE, UNSPECIFIED PATHOLOGICAL FRACTURE PRESENCE: Primary | ICD-10-CM

## 2024-01-22 DIAGNOSIS — M15.9 OSTEOARTHRITIS OF MULTIPLE JOINTS, UNSPECIFIED OSTEOARTHRITIS TYPE: ICD-10-CM

## 2024-01-22 LAB
25(OH)D3 SERPL-MCNC: 61 NG/ML (ref 30–100)
ANION GAP SERPL CALC-SCNC: 18 MMOL/L (ref 10–20)
BASOPHILS # BLD AUTO: 0.09 X10*3/UL (ref 0–0.1)
BASOPHILS NFR BLD AUTO: 0.9 %
BUN SERPL-MCNC: 7 MG/DL (ref 6–23)
CALCIUM SERPL-MCNC: 9.9 MG/DL (ref 8.6–10.6)
CHLORIDE SERPL-SCNC: 108 MMOL/L (ref 98–107)
CO2 SERPL-SCNC: 22 MMOL/L (ref 21–32)
CREAT SERPL-MCNC: 0.82 MG/DL (ref 0.5–1.05)
EGFRCR SERPLBLD CKD-EPI 2021: 81 ML/MIN/1.73M*2
EOSINOPHIL # BLD AUTO: 0.34 X10*3/UL (ref 0–0.7)
EOSINOPHIL NFR BLD AUTO: 3.2 %
ERYTHROCYTE [DISTWIDTH] IN BLOOD BY AUTOMATED COUNT: 13 % (ref 11.5–14.5)
GLUCOSE SERPL-MCNC: 83 MG/DL (ref 74–99)
HCT VFR BLD AUTO: 45.8 % (ref 36–46)
HGB BLD-MCNC: 14.5 G/DL (ref 12–16)
IMM GRANULOCYTES # BLD AUTO: 0.06 X10*3/UL (ref 0–0.7)
IMM GRANULOCYTES NFR BLD AUTO: 0.6 % (ref 0–0.9)
LYMPHOCYTES # BLD AUTO: 3.15 X10*3/UL (ref 1.2–4.8)
LYMPHOCYTES NFR BLD AUTO: 29.8 %
MCH RBC QN AUTO: 28.9 PG (ref 26–34)
MCHC RBC AUTO-ENTMCNC: 31.7 G/DL (ref 32–36)
MCV RBC AUTO: 91 FL (ref 80–100)
MONOCYTES # BLD AUTO: 0.62 X10*3/UL (ref 0.1–1)
MONOCYTES NFR BLD AUTO: 5.9 %
NEUTROPHILS # BLD AUTO: 6.31 X10*3/UL (ref 1.2–7.7)
NEUTROPHILS NFR BLD AUTO: 59.6 %
NRBC BLD-RTO: 0 /100 WBCS (ref 0–0)
PLATELET # BLD AUTO: 408 X10*3/UL (ref 150–450)
POTASSIUM SERPL-SCNC: 4.1 MMOL/L (ref 3.5–5.3)
RBC # BLD AUTO: 5.02 X10*6/UL (ref 4–5.2)
SODIUM SERPL-SCNC: 144 MMOL/L (ref 136–145)
WBC # BLD AUTO: 10.6 X10*3/UL (ref 4.4–11.3)

## 2024-01-22 PROCEDURE — 80048 BASIC METABOLIC PNL TOTAL CA: CPT

## 2024-01-22 PROCEDURE — 3074F SYST BP LT 130 MM HG: CPT | Performed by: INTERNAL MEDICINE

## 2024-01-22 PROCEDURE — 36415 COLL VENOUS BLD VENIPUNCTURE: CPT

## 2024-01-22 PROCEDURE — 3078F DIAST BP <80 MM HG: CPT | Performed by: INTERNAL MEDICINE

## 2024-01-22 PROCEDURE — 99213 OFFICE O/P EST LOW 20 MIN: CPT | Performed by: INTERNAL MEDICINE

## 2024-01-22 PROCEDURE — 82306 VITAMIN D 25 HYDROXY: CPT

## 2024-01-22 PROCEDURE — 3008F BODY MASS INDEX DOCD: CPT | Performed by: INTERNAL MEDICINE

## 2024-01-22 PROCEDURE — 85025 COMPLETE CBC W/AUTO DIFF WBC: CPT

## 2024-01-22 RX ORDER — ALENDRONATE SODIUM 70 MG/1
70 TABLET ORAL
Qty: 12 TABLET | Refills: 0 | Status: SHIPPED | OUTPATIENT
Start: 2024-01-22 | End: 2024-04-09

## 2024-01-22 NOTE — PROGRESS NOTES
Follow-up Rheumatology Patient Visit    Chief Complaint:  Esmer Upton is a 63 y.o. female presenting today for Follow-up.    History of Presenting Problem:   64 y/o female with various joint pain present for evaluation. She report she has had various joint issues in the past, she also report Hx of right Hip Fracture with no Trauma. She also report a sacral insufficiency.Her worst issues is her bilateral feet pain.  Today she reported she is tolerating alendronate weekly  She has been started on Lyrica by her orthopedic for chronic feet pain.  S/p Left hip replacement in March 2023.  She takes ibuprofen intermittently for joint pain   Problem List:   Patient Active Problem List   Diagnosis    Abnormal mammogram    Acute UTI    Anxiety    Arthritis of midfoot    Benign essential hypertension    Carpal tunnel syndrome    Chronic obstructive pulmonary disease (CMS/HCC)    Elevated alkaline phosphatase level    Elevated parathyroid hormone    Elevated white blood cell count    Fibrocystic breast disease (FCBD)    Insomnia    Internal derangement of left knee    Joint pain, knee    Knee sprain    Left knee pain    Lumbar radiculitis    Lumbar spondylosis    Myofascial pain syndrome    Osteoporosis    Plantar fasciitis, right    Arthropathy    Osteoarthritis of multiple joints    Osteoarthritis    Primary localized osteoarthritis of left knee    Primary osteoarthritis, unspecified ankle and foot    Primary localized osteoarthrosis of pelvic region    Right flank pain    Hip pain, right    Right foot pain    Sacral insufficiency fracture    Sacroiliitis (CMS/HCC)    Skin lesion    Smoking greater than 30 pack years    Status post left hip replacement    Stress reaction    Thigh numbness    Back pain    Greater trochanteric bursitis of left hip    Low back pain    Trochanteric bursitis    Vitamin D deficiency    Primary localized osteoarthritis of knee    Arthralgia of hip    Lower back pain       Past Medical  History:   Past Medical History:   Diagnosis Date    Personal history of other diseases of the nervous system and sense organs 10/17/2016    History of acute otitis media    Personal history of other diseases of the respiratory system 12/19/2019    History of sinusitis    Trochanteric bursitis, unspecified hip 01/07/2014    Trochanteric bursitis    Urinary tract infection, site not specified 12/11/2015    Bacterial UTI       Surgical History:   Past Surgical History:   Procedure Laterality Date    COLONOSCOPY  11/12/2012    Complete Colonoscopy    FOOT SURGERY  03/04/2016    Foot Surgery    GALLBLADDER SURGERY  11/12/2012    Gallbladder Surgery    HIP SURGERY  01/07/2014    Hip Surgery    HIP SURGERY  01/07/2013    Hip Surgery    HIP SURGERY  01/07/2013    Hip Surgery    OTHER SURGICAL HISTORY  06/24/2021    Endometrial ablation    XR CHEST PACEMAKER WITH FLUORO  10/31/2012    XR CHEST PACEMAKER WITH FLUORO 10/31/2012 [facility]        Allergies: No Known Allergies    Medications:   Current Outpatient Medications:     alendronate (Fosamax) 70 mg tablet, Take 1 tablet (70 mg) by mouth every 7 days. TAKE 1 TABLET BY MOUTH EVERY WEEK 30 TO 60 MINUTES PRIOR TO BREAKFASTON AN EMPTY STOMACH. DO NOT LIE DOWN AFTER TAKING MEDICATION, Disp: , Rfl:     ibuprofen 800 mg tablet, Take 1 tablet (800 mg) by mouth every 8 hours if needed for mild pain (1 - 3)., Disp: , Rfl:     lidocaine (Lidoderm) 5 % patch, Place 1 patch on the skin once daily. APPLY 1 PATCH TO THE AFFECTED AREA AND LEAVE IN PLACE FOR 12 HOURS, THEN REMOVE AND LEAVE OFF FOR 12 HOURS., Disp: , Rfl:     methylPREDNISolone (Medrol Dospak) 4 mg tablets, Take 1 tablet (4 mg) by mouth. follow instructions on pack, Disp: , Rfl:     pregabalin (Lyrica) 75 mg capsule, Take 1 capsule (75 mg) by mouth 2 times a day., Disp: 60 capsule, Rfl: 3    traMADol (Ultram) 50 mg tablet, Take 1 tablet (50 mg) by mouth every 8 hours if needed for severe pain (7 - 10). 1 TABLET  "PO EVERY 8 HOURS, Disp: 21 tablet, Rfl: 0    traZODone (Desyrel) 100 mg tablet, Take 1 tablet (100 mg) by mouth once daily at bedtime., Disp: 90 tablet, Rfl: 3      Objective   Physical Examination:    Visit Vitals  /78   Pulse 76   Ht 1.6 m (5' 3\")   Wt 85.3 kg (188 lb)   BMI 33.30 kg/m²   OB Status Postmenopausal   Smoking Status Every Day   BSA 1.95 m²        Gen: NAD, A&O x 3  HEENT: clear sclera and conjunctiva,     Musculoskeletal:   Neck; WNL, full ROM  Shoulder: WNL, full ROM  Elbow:WNL, full ROM, no effusion noted  Wrist and fingers;no active synovitis noted, Full ROM in the Wrist , Good fist and   Knees:  No effusions or crepitation, full ROM.  Hips; WNL, full ROM, Negative Dale test  Ankle, Feet; WNL, full ROM    Skin: No rashes or lesions seen, no nail changes  Neuro: A&O x3, Normal Gait    Procedures :None    Orders:  Orders Placed This Encounter   Procedures    XR DEXA bone density    Vitamin D 25-Hydroxy,Total (for eval of Vitamin D levels)    Basic Metabolic Panel        Provider Impression:   Assessment/Plan   Encounter Diagnoses   Name Primary?    Osteoporosis, unspecified osteoporosis type, unspecified pathological fracture presence Yes    Osteoarthritis of multiple joints, unspecified osteoarthritis type       62 y/o female with various joint pain present for evaluation. She report she has had various joint issues in the past, she also report Hx of right Hip Fracture with no Trauma. She also report a sacral insufficiency. X-rays of the foot shows degenerative changes and calcaneal spurs. Her bone density shows that she has osteopenia worse in the lumbar spine. She is noted to have a chronic elevated alkaline phosphatase and a low vitamin D  Review of her imaging and laboratory work-up does not show underlying rheumatoid arthritis of juvenile rheumatoid arthritis she mostly has OA changes.   -Stop Celebrex while she is taking Ibuprofen with recent Hip surgery and worsening knee pain "   -Continue current management with orthopedics  -Continue with vitamin D and also calcium supplement   -Conitnue with alendronate 70 mg weekly vitamin D has improved. Repeat DXA in May 2024  -Follow-up in 6 months

## 2024-02-05 DIAGNOSIS — M54.16 LUMBAR RADICULOPATHY: ICD-10-CM

## 2024-02-05 RX ORDER — PREGABALIN 75 MG/1
75 CAPSULE ORAL 2 TIMES DAILY
Qty: 60 CAPSULE | Refills: 3 | Status: SHIPPED | OUTPATIENT
Start: 2024-02-05

## 2024-03-25 ENCOUNTER — OFFICE VISIT (OUTPATIENT)
Dept: PRIMARY CARE | Facility: CLINIC | Age: 64
End: 2024-03-25
Payer: COMMERCIAL

## 2024-03-25 VITALS
HEART RATE: 84 BPM | TEMPERATURE: 97.7 F | SYSTOLIC BLOOD PRESSURE: 128 MMHG | OXYGEN SATURATION: 100 % | WEIGHT: 191 LBS | DIASTOLIC BLOOD PRESSURE: 70 MMHG | RESPIRATION RATE: 16 BRPM | BODY MASS INDEX: 33.83 KG/M2

## 2024-03-25 DIAGNOSIS — J44.9 CHRONIC OBSTRUCTIVE PULMONARY DISEASE, UNSPECIFIED COPD TYPE (MULTI): ICD-10-CM

## 2024-03-25 PROCEDURE — 3074F SYST BP LT 130 MM HG: CPT | Performed by: INTERNAL MEDICINE

## 2024-03-25 PROCEDURE — 3078F DIAST BP <80 MM HG: CPT | Performed by: INTERNAL MEDICINE

## 2024-03-25 PROCEDURE — 99213 OFFICE O/P EST LOW 20 MIN: CPT | Performed by: INTERNAL MEDICINE

## 2024-03-25 ASSESSMENT — ENCOUNTER SYMPTOMS
BACK PAIN: 1
SLEEP DISTURBANCE: 1

## 2024-04-01 ENCOUNTER — APPOINTMENT (OUTPATIENT)
Dept: PRIMARY CARE | Facility: CLINIC | Age: 64
End: 2024-04-01
Payer: COMMERCIAL

## 2024-05-06 ENCOUNTER — APPOINTMENT (OUTPATIENT)
Dept: ORTHOPEDIC SURGERY | Facility: CLINIC | Age: 64
End: 2024-05-06
Payer: COMMERCIAL

## 2024-05-22 ENCOUNTER — OFFICE VISIT (OUTPATIENT)
Dept: ORTHOPEDIC SURGERY | Facility: CLINIC | Age: 64
End: 2024-05-22
Payer: COMMERCIAL

## 2024-05-22 ENCOUNTER — HOSPITAL ENCOUNTER (OUTPATIENT)
Dept: RADIOLOGY | Facility: CLINIC | Age: 64
Discharge: HOME | End: 2024-05-22
Payer: COMMERCIAL

## 2024-05-22 DIAGNOSIS — M72.2 PLANTAR FIBROMATOSIS: ICD-10-CM

## 2024-05-22 DIAGNOSIS — L84 PLANTAR CALLOSITY: Primary | ICD-10-CM

## 2024-05-22 DIAGNOSIS — M79.671 RIGHT FOOT PAIN: ICD-10-CM

## 2024-05-22 PROCEDURE — 73630 X-RAY EXAM OF FOOT: CPT | Mod: RIGHT SIDE | Performed by: RADIOLOGY

## 2024-05-22 PROCEDURE — 73630 X-RAY EXAM OF FOOT: CPT | Mod: RT

## 2024-05-22 PROCEDURE — 99213 OFFICE O/P EST LOW 20 MIN: CPT | Performed by: ORTHOPAEDIC SURGERY

## 2024-05-22 PROCEDURE — 20600 DRAIN/INJ JOINT/BURSA W/O US: CPT | Performed by: ORTHOPAEDIC SURGERY

## 2024-05-22 RX ORDER — METHYLPREDNISOLONE ACETATE 40 MG/ML
40 INJECTION, SUSPENSION INTRA-ARTICULAR; INTRALESIONAL; INTRAMUSCULAR; SOFT TISSUE
Status: COMPLETED | OUTPATIENT
Start: 2024-05-22 | End: 2024-05-22

## 2024-05-22 RX ORDER — LIDOCAINE HYDROCHLORIDE 20 MG/ML
1 INJECTION, SOLUTION INFILTRATION; PERINEURAL
Status: COMPLETED | OUTPATIENT
Start: 2024-05-22 | End: 2024-05-22

## 2024-05-22 RX ADMIN — METHYLPREDNISOLONE ACETATE 40 MG: 40 INJECTION, SUSPENSION INTRA-ARTICULAR; INTRALESIONAL; INTRAMUSCULAR; SOFT TISSUE at 09:04

## 2024-05-22 RX ADMIN — LIDOCAINE HYDROCHLORIDE 1 ML: 20 INJECTION, SOLUTION INFILTRATION; PERINEURAL at 09:04

## 2024-05-22 ASSESSMENT — PAIN DESCRIPTION - DESCRIPTORS: DESCRIPTORS: ACHING;BURNING;SHARP;SHOOTING;THROBBING

## 2024-05-22 ASSESSMENT — PAIN - FUNCTIONAL ASSESSMENT: PAIN_FUNCTIONAL_ASSESSMENT: 0-10

## 2024-05-22 ASSESSMENT — PAIN SCALES - GENERAL: PAINLEVEL_OUTOF10: 2

## 2024-05-22 NOTE — PROGRESS NOTES
Returns for right foot.  The painful calluses over the lateral aspect of the fifth metatarsal and under the plantar aspect.  Been going on about 8 months.  Has tried shaving it down on her own.  Is also had a painful lump along the medial arch.  Is going on about 5 months.  Denies any injury.  Regular shoewear.    Exam: Tender swelling along medial arch along medial plantar fascia with small nodule.  No pain first TMT joint.  No palpable spur.  Has plantar and lateral callosity around fifth metatarsal head and around fourth metatarsal head.  No obvious viral inclusion.    I personally reviewed the following radiographic exams: Right foot shows some midfoot arthritis without change.  Reasonable first TMT joint.  Small spur off the lateral head.  No abnormality of the lesser metatarsal heads.    Assessment: Callosities right foot.  Tender plantar fibroma medial plantar fascia.    Plan: Discussed nonoperative and operative options in detail.   Risk and benefits discussed in detail. All questions answered today.  Recovery timeline and expectations discussed in detail.  Recommend evaluation by podiatry for plantar callosities or for 1 more treatment.  Do not see any bony issue from a surgical standpoint.  Offered cortisone injection around the tender fibroma.  Consider advanced imaging.  After informed consent under sterile conditions the right plantar fibroma was injected with 1 cc total, a combination of  2% lidocaine and 40mg Methylprednisolone.  Risks and benefits were discussed in detail.  Patient ID: Esmer Upton is a 64 y.o. female.    S Inj/Asp plantar fibroma on 5/22/2024 9:04 AM  Indications: pain  Details: 25 G needle  Medications: 40 mg methylPREDNISolone acetate 40 mg/mL; 1 mL lidocaine 20 mg/mL (2 %)

## 2024-06-03 ENCOUNTER — HOSPITAL ENCOUNTER (OUTPATIENT)
Dept: RADIOLOGY | Facility: CLINIC | Age: 64
Discharge: HOME | End: 2024-06-03
Payer: COMMERCIAL

## 2024-06-03 DIAGNOSIS — M81.0 OSTEOPOROSIS, UNSPECIFIED OSTEOPOROSIS TYPE, UNSPECIFIED PATHOLOGICAL FRACTURE PRESENCE: ICD-10-CM

## 2024-06-03 PROCEDURE — 77080 DXA BONE DENSITY AXIAL: CPT

## 2024-07-01 DIAGNOSIS — M81.0 OSTEOPOROSIS, UNSPECIFIED OSTEOPOROSIS TYPE, UNSPECIFIED PATHOLOGICAL FRACTURE PRESENCE: ICD-10-CM

## 2024-07-01 RX ORDER — ALENDRONATE SODIUM 70 MG/1
70 TABLET ORAL
Qty: 12 TABLET | Refills: 0 | Status: SHIPPED | OUTPATIENT
Start: 2024-07-01 | End: 2024-09-17

## 2024-07-22 ENCOUNTER — APPOINTMENT (OUTPATIENT)
Dept: RHEUMATOLOGY | Facility: CLINIC | Age: 64
End: 2024-07-22
Payer: COMMERCIAL

## 2024-08-02 DIAGNOSIS — M54.16 LUMBAR RADICULOPATHY: ICD-10-CM

## 2024-08-02 RX ORDER — PREGABALIN 75 MG/1
75 CAPSULE ORAL 2 TIMES DAILY
Qty: 60 CAPSULE | Refills: 3 | Status: SHIPPED | OUTPATIENT
Start: 2024-08-02

## 2024-08-12 ENCOUNTER — APPOINTMENT (OUTPATIENT)
Dept: RHEUMATOLOGY | Facility: CLINIC | Age: 64
End: 2024-08-12
Payer: COMMERCIAL

## 2024-08-12 VITALS
HEART RATE: 79 BPM | BODY MASS INDEX: 34.54 KG/M2 | WEIGHT: 195 LBS | SYSTOLIC BLOOD PRESSURE: 123 MMHG | DIASTOLIC BLOOD PRESSURE: 83 MMHG

## 2024-08-12 DIAGNOSIS — M81.0 OSTEOPOROSIS, UNSPECIFIED OSTEOPOROSIS TYPE, UNSPECIFIED PATHOLOGICAL FRACTURE PRESENCE: Primary | ICD-10-CM

## 2024-08-12 DIAGNOSIS — M15.9 OSTEOARTHRITIS OF MULTIPLE JOINTS, UNSPECIFIED OSTEOARTHRITIS TYPE: ICD-10-CM

## 2024-08-12 PROCEDURE — 99214 OFFICE O/P EST MOD 30 MIN: CPT | Performed by: INTERNAL MEDICINE

## 2024-08-12 PROCEDURE — 3079F DIAST BP 80-89 MM HG: CPT | Performed by: INTERNAL MEDICINE

## 2024-08-12 PROCEDURE — 3074F SYST BP LT 130 MM HG: CPT | Performed by: INTERNAL MEDICINE

## 2024-08-12 RX ORDER — CYCLOBENZAPRINE HCL 10 MG
1 TABLET ORAL EVERY 8 HOURS PRN
COMMUNITY
Start: 2024-07-01

## 2024-08-12 RX ORDER — MELOXICAM 15 MG/1
1 TABLET ORAL
COMMUNITY
Start: 2024-07-15

## 2024-08-12 RX ORDER — TERBINAFINE HYDROCHLORIDE 250 MG/1
250 TABLET ORAL DAILY
COMMUNITY
Start: 2024-05-28

## 2024-08-12 NOTE — PROGRESS NOTES
Follow-up Rheumatology Patient Visit    Chief Complaint:  Esmer Upton is a 64 y.o. female presenting today for Follow-up.    History of Presenting Problem:   65 y/o female with various joint pain present for evaluation. She report she has had various joint issues in the past, she also report Hx of right Hip Fracture with no Trauma. She also report a sacral insufficiency.Her worst issues is her bilateral feet pain.  Today she reported she is tolerating alendronate weekly  She has been started on Lyrica by her orthopedic for chronic feet pain.  S/p Left hip replacement in March 2023.    She is currently on Meloxicam per her foot doctor which seem to be helping. She is dealing with chronic lowe back pain, worse with standing.She did dee dee pain management and injection did not help.   Problem List:   Patient Active Problem List   Diagnosis    Abnormal mammogram    Acute UTI    Anxiety    Arthritis of midfoot    Benign essential hypertension    Carpal tunnel syndrome    Chronic obstructive pulmonary disease (Multi)    Elevated alkaline phosphatase level    Elevated parathyroid hormone    Elevated white blood cell count    Fibrocystic breast disease (FCBD)    Insomnia    Internal derangement of left knee    Joint pain, knee    Knee sprain    Left knee pain    Lumbar radiculitis    Lumbar spondylosis    Myofascial pain syndrome    Osteoporosis    Plantar fasciitis, right    Arthropathy    Osteoarthritis of multiple joints    Osteoarthritis    Primary localized osteoarthritis of left knee    Primary osteoarthritis, unspecified ankle and foot    Primary localized osteoarthrosis of pelvic region    Right flank pain    Hip pain, right    Right foot pain    Sacral insufficiency fracture    Sacroiliitis (CMS-HCC)    Skin lesion    Smoking greater than 30 pack years    Status post left hip replacement    Stress reaction    Thigh numbness    Back pain    Greater trochanteric bursitis of left hip    Low back pain     Trochanteric bursitis    Vitamin D deficiency    Primary localized osteoarthritis of knee    Arthralgia of hip    Lower back pain       Past Medical History:   Past Medical History:   Diagnosis Date    Personal history of other diseases of the nervous system and sense organs 10/17/2016    History of acute otitis media    Personal history of other diseases of the respiratory system 12/19/2019    History of sinusitis    Trochanteric bursitis, unspecified hip 01/07/2014    Trochanteric bursitis    Urinary tract infection, site not specified 12/11/2015    Bacterial UTI       Surgical History:   Past Surgical History:   Procedure Laterality Date    COLONOSCOPY  11/12/2012    Complete Colonoscopy    FOOT SURGERY  03/04/2016    Foot Surgery    GALLBLADDER SURGERY  11/12/2012    Gallbladder Surgery    HIP SURGERY  01/07/2014    Hip Surgery    HIP SURGERY  01/07/2013    Hip Surgery    HIP SURGERY  01/07/2013    Hip Surgery    OTHER SURGICAL HISTORY  06/24/2021    Endometrial ablation    XR CHEST PACEMAKER WITH FLUORO  10/31/2012    XR CHEST PACEMAKER WITH FLUORO 10/31/2012 [facility]        Allergies: No Known Allergies    Medications:   Current Outpatient Medications:     alendronate (Fosamax) 70 mg tablet, Take 1 tablet (70 mg) by mouth every 7 days for 12 doses. TAKE 1 TABLET BY MOUTH EVERY WEEK 30 TO 60 MINUTES PRIOR TO BREAKFASTON AN EMPTY STOMACH. DO NOT LIE DOWN AFTER TAKING MEDICATION, Disp: 12 tablet, Rfl: 0    cyclobenzaprine (Flexeril) 10 mg tablet, Take 1 tablet (10 mg) by mouth every 8 hours if needed for pain., Disp: , Rfl:     ibuprofen 800 mg tablet, Take 1 tablet (800 mg) by mouth every 8 hours if needed for mild pain (1 - 3)., Disp: , Rfl:     lidocaine (Lidoderm) 5 % patch, Place 1 patch on the skin once daily. APPLY 1 PATCH TO THE AFFECTED AREA AND LEAVE IN PLACE FOR 12 HOURS, THEN REMOVE AND LEAVE OFF FOR 12 HOURS., Disp: , Rfl:     meloxicam (Mobic) 15 mg tablet, Take 1 tablet (15 mg) by mouth  early in the morning.., Disp: , Rfl:     pregabalin (Lyrica) 75 mg capsule, Take 1 capsule (75 mg) by mouth 2 times a day., Disp: 60 capsule, Rfl: 3    terbinafine (LamISIL) 250 mg tablet, Take 1 tablet (250 mg) by mouth once daily., Disp: , Rfl:     traZODone (Desyrel) 100 mg tablet, Take 1 tablet (100 mg) by mouth once daily at bedtime., Disp: 90 tablet, Rfl: 3      Objective   Physical Examination:    Visit Vitals  /83   Pulse 79   Wt 88.5 kg (195 lb)   BMI 34.54 kg/m²   OB Status Postmenopausal   Smoking Status Every Day   BSA 1.98 m²        Gen: NAD, A&O x 3  HEENT: clear sclera and conjunctiva,     Musculoskeletal:   Neck; WNL, full ROM  Shoulder: WNL, full ROM  Elbow:WNL, full ROM, no effusion noted  Wrist and fingers;no active synovitis noted, Full ROM in the Wrist , Good fist and   Knees:  No effusions or crepitation, full ROM.  Hips; WNL, full ROM, Negative Dale test  Ankle, Feet; WNL, full ROM    Skin: No rashes or lesions seen, no nail changes  Neuro: A&O x3, Normal Gait    Procedures :None    Orders:  No orders of the defined types were placed in this encounter.       Provider Impression:   Assessment/Plan   Encounter Diagnoses   Name Primary?    Osteoporosis, unspecified osteoporosis type, unspecified pathological fracture presence Yes    Osteoarthritis of multiple joints, unspecified osteoarthritis type         63 y/o female with various joint pain present for evaluation. She report she has had various joint issues in the past, she also report Hx of right Hip Fracture with no Trauma. She also report a sacral insufficiency. X-rays of the foot shows degenerative changes and calcaneal spurs. Her bone density shows that she has osteopenia worse in the lumbar spine. She is noted to have a chronic elevated alkaline phosphatase and a low vitamin D  Review of her imaging and laboratory work-up does not show underlying rheumatoid arthritis of juvenile rheumatoid arthritis she mostly has OA  changes.   -Continue current management with orthopedics  -Continue with vitamin D and also calcium supplement   -Conitnue with alendronate 70 mg weekly vitamin D. Her Tsocre June 2024 show total right Hip score is -1.1, neck is -1.2, keep on the same  Repeat DXA in May 2026  -Follow-up in 12 months

## 2024-08-16 ENCOUNTER — APPOINTMENT (OUTPATIENT)
Dept: NEUROSURGERY | Facility: CLINIC | Age: 64
End: 2024-08-16
Payer: COMMERCIAL

## 2024-08-16 VITALS
SYSTOLIC BLOOD PRESSURE: 118 MMHG | HEIGHT: 63 IN | BODY MASS INDEX: 34.66 KG/M2 | HEART RATE: 87 BPM | DIASTOLIC BLOOD PRESSURE: 70 MMHG | WEIGHT: 195.6 LBS

## 2024-08-16 DIAGNOSIS — G89.29 CHRONIC MIDLINE THORACIC BACK PAIN: Primary | ICD-10-CM

## 2024-08-16 DIAGNOSIS — M54.6 CHRONIC MIDLINE THORACIC BACK PAIN: Primary | ICD-10-CM

## 2024-08-16 PROCEDURE — 99203 OFFICE O/P NEW LOW 30 MIN: CPT | Performed by: NURSE PRACTITIONER

## 2024-08-16 PROCEDURE — 3074F SYST BP LT 130 MM HG: CPT | Performed by: NURSE PRACTITIONER

## 2024-08-16 PROCEDURE — 3008F BODY MASS INDEX DOCD: CPT | Performed by: NURSE PRACTITIONER

## 2024-08-16 PROCEDURE — 3078F DIAST BP <80 MM HG: CPT | Performed by: NURSE PRACTITIONER

## 2024-08-16 ASSESSMENT — PAIN SCALES - GENERAL: PAINLEVEL: 4

## 2024-08-16 ASSESSMENT — PATIENT HEALTH QUESTIONNAIRE - PHQ9
10. IF YOU CHECKED OFF ANY PROBLEMS, HOW DIFFICULT HAVE THESE PROBLEMS MADE IT FOR YOU TO DO YOUR WORK, TAKE CARE OF THINGS AT HOME, OR GET ALONG WITH OTHER PEOPLE: SOMEWHAT DIFFICULT
SUM OF ALL RESPONSES TO PHQ9 QUESTIONS 1 AND 2: 1
2. FEELING DOWN, DEPRESSED OR HOPELESS: NOT AT ALL
1. LITTLE INTEREST OR PLEASURE IN DOING THINGS: SEVERAL DAYS

## 2024-08-16 NOTE — PROGRESS NOTES
"It was a pleasure to see Esmer Upton on 8/16/2024. She is a 64 y.o. year old female who presents to the [facility] for evaluation of mid back pain. Patient is referred by No ref. provider found. PMH is significant for HTN / HPL, OA, OP on alendronate (h/o sacral insufficiency fx, non-traumatic hip fx), chronic LBP. She lives at home with 3 cats. 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 Most recent T Spine imaging reviewed from CT Lungs in 12/2023, without note of thoracic fracture; presence of anterior osteophytes noted; T 7 - 8 disc osteophyte complex noted to my interpretation. T Spine x-rays 07/18/2023, reviewed with note of levoscoliosis.  Progression of symptoms prompted today's visit. Thus far, patient has tried activity adjustment with little to no improvement of symptoms. She denies change in bowel / bladder function, saddle anesthesia, imbalance, falls, difficulty dressing, difficulty holding / opening objects.     PREVIOUS TREATMENTS  NSAIDs  Pregabalin  Topical    Previous Spine Surgery: No     Smoker: YES   Anticoagulation / Antiplatelets: YES: NSAID use     ROS: 12 / 12 systems reviewed and are negative unless noted in HPI    /70 (BP Location: Left arm, Patient Position: Sitting, BP Cuff Size: Large adult)   Pulse 87   Ht 1.6 m (5' 3\")   Wt 88.7 kg (195 lb 9.6 oz)   BMI 34.65 kg/m²     ON EXAM:  General: Well developed female, awake/alert/oriented x 3, no distress, alert and cooperative  Skin: Warm and dry, no visible lesions / rashes  ENMT: Mucous membranes moist, no apparent injury  Head/Neck: No apparent injury  Respiratory/Thorax: Normal breathing with good chest expansion, thorax symmetric  Cardiovascular: No pitting edema, no JVD  Gastrointestinal: " Non-distended  NEUROLOGICAL EXAM:  EOMI, face symmetric  Motor Strength: 5/5 in BUE  Muscle Tone: Normal without spasticity or contractures in all extremities  Muscle Bulk: Normal and symmetric in all extremities  Posture:  -- Cervical: Normal  -- Thoracic: Normal  -- Lumbar : Normal  Paraspinal muscle spasm/tenderness absent.  No palpable tenderness along the spinous processes.  Sensation: THORACIC DERMATOMES: sensory deficit in left T7 - 8 dermatome; otherwise, SILT in thoracic dermatomes  Gait: is steady    Esmer Upton has thoracic spondylosis with possible thoracic radiculopathy. We reviewed images of CT Lungs done 12/2023 with note of disc / osteophyte complex as noted above. We  discussed rationale Physical Therapy for Home Exercise Program. Encouraged follow up in 8 weeks for reassessment. If not improved, would consider MRI T Spine. She verbalizes understanding and agreement with plan.  Dana Tomlinson, APRN-CNP

## 2024-09-18 ENCOUNTER — TELEPHONE (OUTPATIENT)
Dept: ORTHOPEDIC SURGERY | Facility: CLINIC | Age: 64
End: 2024-09-18
Payer: COMMERCIAL

## 2024-09-18 DIAGNOSIS — M79.671 RIGHT FOOT PAIN: Primary | ICD-10-CM

## 2024-09-18 RX ORDER — LIDOCAINE 50 MG/G
1 PATCH TOPICAL DAILY
Qty: 30 PATCH | Refills: 1 | Status: SHIPPED | OUTPATIENT
Start: 2024-09-18 | End: 2024-10-18

## 2024-09-18 NOTE — PROGRESS NOTES
Lidocaine patch refill submitted as requested.    Ezio Gage MD, ANJALI  Department of Orthopaedic Surgery  [facility]

## 2024-09-19 ENCOUNTER — OFFICE VISIT (OUTPATIENT)
Dept: PRIMARY CARE | Facility: CLINIC | Age: 64
End: 2024-09-19
Payer: COMMERCIAL

## 2024-09-19 ENCOUNTER — HOSPITAL ENCOUNTER (OUTPATIENT)
Dept: RADIOLOGY | Facility: CLINIC | Age: 64
Discharge: HOME | End: 2024-09-19
Payer: COMMERCIAL

## 2024-09-19 ENCOUNTER — OFFICE VISIT (OUTPATIENT)
Dept: ORTHOPEDIC SURGERY | Facility: CLINIC | Age: 64
End: 2024-09-19
Payer: COMMERCIAL

## 2024-09-19 ENCOUNTER — HOSPITAL ENCOUNTER (OUTPATIENT)
Dept: RADIOLOGY | Facility: EXTERNAL LOCATION | Age: 64
Discharge: HOME | End: 2024-09-19

## 2024-09-19 VITALS
RESPIRATION RATE: 16 BRPM | TEMPERATURE: 97.4 F | SYSTOLIC BLOOD PRESSURE: 158 MMHG | HEART RATE: 91 BPM | BODY MASS INDEX: 35.25 KG/M2 | DIASTOLIC BLOOD PRESSURE: 70 MMHG | WEIGHT: 199 LBS | OXYGEN SATURATION: 97 %

## 2024-09-19 VITALS — HEIGHT: 63 IN | WEIGHT: 200 LBS | BODY MASS INDEX: 35.44 KG/M2

## 2024-09-19 DIAGNOSIS — M25.562 LEFT KNEE PAIN, UNSPECIFIED CHRONICITY: ICD-10-CM

## 2024-09-19 DIAGNOSIS — M25.562 LEFT KNEE PAIN, UNSPECIFIED CHRONICITY: Primary | ICD-10-CM

## 2024-09-19 DIAGNOSIS — M17.12 OSTEOARTHRITIS OF LEFT KNEE, UNSPECIFIED OSTEOARTHRITIS TYPE: ICD-10-CM

## 2024-09-19 DIAGNOSIS — M25.562 ACUTE PAIN OF LEFT KNEE: Primary | ICD-10-CM

## 2024-09-19 PROCEDURE — 2500000004 HC RX 250 GENERAL PHARMACY W/ HCPCS (ALT 636 FOR OP/ED): Performed by: FAMILY MEDICINE

## 2024-09-19 PROCEDURE — 73564 X-RAY EXAM KNEE 4 OR MORE: CPT | Mod: LEFT SIDE | Performed by: FAMILY MEDICINE

## 2024-09-19 PROCEDURE — 2500000005 HC RX 250 GENERAL PHARMACY W/O HCPCS: Performed by: FAMILY MEDICINE

## 2024-09-19 PROCEDURE — 3077F SYST BP >= 140 MM HG: CPT | Performed by: INTERNAL MEDICINE

## 2024-09-19 PROCEDURE — 3008F BODY MASS INDEX DOCD: CPT | Performed by: FAMILY MEDICINE

## 2024-09-19 PROCEDURE — 20611 DRAIN/INJ JOINT/BURSA W/US: CPT | Mod: LT | Performed by: FAMILY MEDICINE

## 2024-09-19 PROCEDURE — 99204 OFFICE O/P NEW MOD 45 MIN: CPT | Performed by: FAMILY MEDICINE

## 2024-09-19 PROCEDURE — 99214 OFFICE O/P EST MOD 30 MIN: CPT | Performed by: FAMILY MEDICINE

## 2024-09-19 PROCEDURE — 3078F DIAST BP <80 MM HG: CPT | Performed by: INTERNAL MEDICINE

## 2024-09-19 PROCEDURE — 99213 OFFICE O/P EST LOW 20 MIN: CPT | Performed by: INTERNAL MEDICINE

## 2024-09-19 PROCEDURE — 73564 X-RAY EXAM KNEE 4 OR MORE: CPT | Mod: LT

## 2024-09-19 RX ORDER — LIDOCAINE HYDROCHLORIDE 10 MG/ML
6 INJECTION, SOLUTION INFILTRATION; PERINEURAL
Status: COMPLETED | OUTPATIENT
Start: 2024-09-19 | End: 2024-09-19

## 2024-09-19 RX ORDER — TRIAMCINOLONE ACETONIDE 40 MG/ML
40 INJECTION, SUSPENSION INTRA-ARTICULAR; INTRAMUSCULAR
Status: COMPLETED | OUTPATIENT
Start: 2024-09-19 | End: 2024-09-19

## 2024-09-19 ASSESSMENT — PATIENT HEALTH QUESTIONNAIRE - PHQ9
1. LITTLE INTEREST OR PLEASURE IN DOING THINGS: NOT AT ALL
SUM OF ALL RESPONSES TO PHQ9 QUESTIONS 1 AND 2: 0
1. LITTLE INTEREST OR PLEASURE IN DOING THINGS: NOT AT ALL
2. FEELING DOWN, DEPRESSED OR HOPELESS: NOT AT ALL
SUM OF ALL RESPONSES TO PHQ9 QUESTIONS 1 AND 2: 0
2. FEELING DOWN, DEPRESSED OR HOPELESS: NOT AT ALL

## 2024-09-19 NOTE — PROGRESS NOTES
Patient here for left knee pain- trouble walking    Subjective   Patient ID: Esmer Upton is a 64 y.o. female who presents for Knee Pain.    The patient reports intermittent sharp left knee pain since 9/14/2024 with acute exacerbation on 9/17/2024 that left her unable to get out of her car.  She recalls seeing a small bruise over the joint at the time.  The knee pain is worsened upon standing and applying weight to the left lower extremity.  The patient denies any history of gout.  She also reports no known injury or unusual exertion that may have precipitated the pain.  She states that symptoms seem to be worsening with time.      The patient mentions a previous history of left hip replacement surgery on 3/21/2024 with  from Orthopedic [facility].    Knee Pain       Review of Systems   Musculoskeletal:         Positive for left knee pain.   All other systems reviewed and are negative.      Objective   Physical Exam  Constitutional:       Appearance: Normal appearance.   Neck:      Vascular: No carotid bruit.   Cardiovascular:      Rate and Rhythm: Normal rate and regular rhythm.      Heart sounds: Normal heart sounds.   Pulmonary:      Effort: Pulmonary effort is normal.      Breath sounds: Normal breath sounds.   Abdominal:      General: Bowel sounds are normal.      Palpations: Abdomen is soft.      Tenderness: There is no abdominal tenderness.   Skin:     General: Skin is warm and dry.   Neurological:      General: No focal deficit present.      Mental Status: She is alert and oriented to person, place, and time. Mental status is at baseline.   Psychiatric:         Mood and Affect: Mood normal.         Behavior: Behavior normal.         Assessment/Plan   Problem List Items Addressed This Visit    None      IMPRESSION/PLAN:      Left Knee Pain  - Symptoms reproduced with extension.  Advised patient to reach out to [facility], and patient given directions.  Patient will be heading over to  their clinic now.      /70 in the office today.     Insomnia   - Symptoms ongoing.  Advised the patient to try Sleep 3 [employer] over the counter.  Maintained with trazodone 100mg at bedtime.    History of Smoking   - Last CT Chest 12/2022 showed few stable small bilateral noncalcified pulmonary nodules measuring up to 6 mm, likely benign.  12/2023 repeat CT showed Mid to upper lung zone predominant reticular and ground-glass changes appear grossly stable, potentially postinfectious/inflammatory sequelae, although could be in the spectrum of smoking-related lung disease.  Lung nodules stable.    Carpel Tunnel   - Stable. Continue wearing wrist splints to immobilize while sleeping. Call if symptoms worsen.     Left Hip Arthritis   - s/p Total hip replacement on 3/21/2023 with  from Orthopedic [facility].    Bilateral Thoracic Back Pain  - Worsening per last Xray 7/2023.  Advised patient to follow-up with  from Orthopedics-Spine or  from Neurosurgery soon.  Previously on tramadol 50 mg up to TID as needed.  Patient also okay to try applying lidocaine patches to the area and continue with pregabalin to 75 mg twice daily as below. Following with  from Pain Management at [facility].    Lumbar Radiculopathy  - Followed with Dr. Liang in Orthopedics, and Dr. Schneider in Pain Medicine.  Currently following with  from Pain Management at [facility].      Right foot and Bilateral Hip pain   - Increase pregabalin 75 mg BID and advised to limit Ibuprofen as much as possible to prevent adverse effects. Following with  in Orthopedic Surgery.    Osteoporosis   - Continue with Alendronate 70 mg as 1 tablet every week 30 to 60 minutes before breakfast on an empty stomach. Do not lie down after taking medication.     Health Maintenance   - Routine labs 10/2023.  Last PAP 6/2021. Last Mammogram 12/2023. Last colonoscopy performed 12/2022, repeat due 12/2025.     Follow up  in 6 months, call sooner if needed.       Scribe Attestation  By signing my name below, I, Annetta Linda, [employer]   attest that this documentation has been prepared under the direction and in the presence of Papo Duong DO.   Annetta Linda 09/19/24 10:36 AM

## 2024-09-19 NOTE — LETTER
September 19, 2024     Patient: Esmer Upton   YOB: 1960   Date of Visit: 9/19/2024       To Whom It May Concern:    It is my medical opinion that Esmer Upton  missed work from 09/17/24 through 09/19/24  .    If you have any questions or concerns, please don't hesitate to call.         Sincerely,        Cole C Budinsky, MD

## 2024-09-19 NOTE — PROGRESS NOTES
Acute Injury New Patient Visit    CC:   Chief Complaint   Patient presents with    Left Knee - Pain     Left knee pain , no known injury started Saturday. Pain all around, xrays today       HPI: Esmer is a 64 y.o.female who presents today with new complaints of increased worsening pain discomfort to the left knee.  She states that her orthopedic surgeon Dr. Jb Chino was unavailable to see her over the next several days and she had worsening discomfort about the knee and was interested in getting an injection.  She prefers to have the injection versus an oral steroid pack she is also interested in considering a knee brace.  There was no obvious injury slip trip or fall she status post left hip arthroplasty with Dr. Jb Chino.        Review of Systems   GENERAL: Negative for malaise, significant weight loss, fever  MUSCULOSKELETAL: See HPI  NEURO: Negative for numbness / tingling     Past Medical History  Past Medical History:   Diagnosis Date    Personal history of other diseases of the nervous system and sense organs 10/17/2016    History of acute otitis media    Personal history of other diseases of the respiratory system 2019    History of sinusitis    Trochanteric bursitis, unspecified hip 2014    Trochanteric bursitis    Urinary tract infection, site not specified 2015    Bacterial UTI       Medication review  Medication Documentation Review Audit       Reviewed by Cole C Budinsky, MD (Physician) on 24 at 2250      Medication Order Taking? Sig Documenting Provider Last Dose Status   alendronate (Fosamax) 70 mg tablet [id number] No Take 1 tablet (70 mg) by mouth every 7 days for 12 doses. TAKE 1 TABLET BY MOUTH EVERY WEEK 30 TO 60 MINUTES PRIOR TO BREAKFASTON AN EMPTY STOMACH. DO NOT LIE DOWN AFTER TAKING MEDICATION Lana Dahl, DO Taking  24 2184   cyclobenzaprine (Flexeril) 10 mg tablet [id number] No Take 1 tablet (10 mg) by mouth every 8 hours if needed for pain.  Historical Provider, MD Taking Active   ibuprofen 800 mg tablet [id number] No Take 1 tablet (800 mg) by mouth every 8 hours if needed for mild pain (1 - 3). Historical Provider, MD Taking Active   Discontinued 09/18/24 1042   lidocaine (Lidoderm) 5 % patch [id number] No Place 1 patch over 12 hours on the skin once daily. APPLY 1 PATCH TO THE AFFECTED AREA AND LEAVE IN PLACE FOR 12 HOURS, THEN REMOVE AND LEAVE OFF FOR 12 HOURS. Ezio Gage MD Taking Active   meloxicam (Mobic) 15 mg tablet [id number] No Take 1 tablet (15 mg) by mouth early in the morning.. Historical Provider, MD Taking Active   pregabalin (Lyrica) 75 mg capsule [id number] No Take 1 capsule (75 mg) by mouth 2 times a day. Papo Duong DO Taking Active   Discontinued 09/19/24 1100   traZODone (Desyrel) 100 mg tablet [id number] No Take 1 tablet (100 mg) by mouth once daily at bedtime. Papo Duong DO Taking Active                    Allergies  No Known Allergies    Social History  Social History     Socioeconomic History    Marital status:      Spouse name: Not on file    Number of children: 2    Years of education: Not on file    Highest education level: Not on file   Occupational History    Not on file   Tobacco Use    Smoking status: Every Day     Types: Cigarettes    Smokeless tobacco: Never   Vaping Use    Vaping status: Never Used   Substance and Sexual Activity    Alcohol use: Never    Drug use: Never    Sexual activity: Defer   Other Topics Concern    Not on file   Social History Narrative    Not on file     Social Determinants of Health     Financial Resource Strain: Not on file   Food Insecurity: Not on file   Transportation Needs: Not on file   Physical Activity: Not on file   Stress: Not on file   Social Connections: Not on file   Intimate Partner Violence: Not on file   Housing Stability: Not on file       Surgical History  Past Surgical History:   Procedure Laterality Date    COLONOSCOPY  11/12/2012    Complete  Colonoscopy    FOOT SURGERY  03/04/2016    Foot Surgery    GALLBLADDER SURGERY  11/12/2012    Gallbladder Surgery    HIP SURGERY  01/07/2014    Hip Surgery    HIP SURGERY  01/07/2013    Hip Surgery    HIP SURGERY  01/07/2013    Hip Surgery    OTHER SURGICAL HISTORY  06/24/2021    Endometrial ablation    XR CHEST PACEMAKER WITH FLUORO  10/31/2012    XR CHEST PACEMAKER WITH FLUORO 10/31/2012 [facility]       Physical Exam:  GENERAL:  Patient is awake, alert, and oriented to person place and time.  Patient appears well nourished and well kept.  Affect Calm, Not Acutely Distressed.  HEENT:  Normocephalic, Atraumatic, EOMI  CARDIOVASCULAR:  Hemodynamically stable.  RESPIRATORY:  Normal respirations with unlabored breathing.  NEURO: Gross sensation intact to the lower extremities bilaterally.  Extremity: Left knee exam: The affected knee was examined and inspected and was tender to the touch along the medial and lateral aspect with catching, locking or mechanical symptoms. The skin was intact without breakdown or open wound. Old incisions if present were healed. There was a mild Logan exam seen with some evidence of instability & weakness in the collateral ligaments with varus/valgus stress & laxity in the anterior or posterior planes. There was a negative Lachman´s test, pivot shift test and posterior drawer sign with no foot drop, numbness or tingling. Sensation, reflexes and pulses in the foot and ankle are preserved. There was an effusion. Range of motion showed good straight leg raise with flexion to 135 degrees and extension to 0 degrees. The patient had the ability to bear weight, but with discomfort. The patient´s gait was antalgic secondary to the discomfort.      Diagnostics: X-rays today demonstrate mild osteoarthritic changes small joint effusion  XR knee left 4+ views          Interpreted By:  Budinsky, Cole,   STUDY:  XR KNEE LEFT 4+ VIEWS; ;  9/19/2024 1:26 pm       INDICATION:  Signs/Symptoms:pain.      ACCESSION NUMBER(S):  [id number]      ORDERING CLINICIAN:  COLE BUDINSKY      IMPRESSION:  Left knee films demonstrate mild-to-moderate tricompartmental  degenerative changes with no obvious presence for loose or  intra-articular body. Stable appearing osteophytes versus prior  avulsion injury calcification seen to the lateral joint space. No  obvious suprapatellar joint effusion. Overall impression chronic  degenerative changes about the left knee without any obvious acute  bony pathology.          Signed by: Cole Budinsky 9/19/2024 5:33 PM  Dictation workstation:   [id number]             Procedure: US Guided left knee Injection:    Before injection the risks of this procedure including but not limited to;  infection, local skin irritation, skin atrophy, calcification, continued pain or discomfort, elevated blood sugar, burning, failure to relieve pain, possible late infection were all discussed with the patient.  The patient verbalized understanding and consented to the procedure.     After informed consent was provided, patient identification was confirmed, and allergies were verified, the patient was appropriately positioned. The patient´s [left] Knee was evaluated in both the short and long axis via ultrasound to identify the intraarticular joint space. An effusion [was] present.  The site was marked and time-out performed.      The injection site was prepped in the usual sterile manner to provide a sterile environment. The skin was anesthetized with ethyl chloride spray. The injection was performed with standard technique. A 22G needle was passed through the skin into the joint space via the superolateral approach with direct ultrasound guidance.  [7] cc of injectate consisting of [1] cc´s [Kenalog] and [6] cc´s 1% lidocaine without epi was instilled into the joint space.      The needle was withdrawn and the puncture site was secured with a Band-Aid. The patient  tolerated the procedure well without complication.     Post-procedure discomfort can be alleviated with additional medication, ice, elevation, and rest over the first 24 hours as recommended.    L Inj/Asp: L knee on 9/19/2024 10:53 PM  Indications: pain and joint swelling  Details: 22 G needle, ultrasound-guided superolateral approach  Medications: 6 mL lidocaine 10 mg/mL (1 %); 40 mg triamcinolone acetonide 40 mg/mL  Outcome: tolerated well, no immediate complications  Procedure, treatment alternatives, risks and benefits explained, specific risks discussed. Consent was given by the patient. Immediately prior to procedure a time out was called to verify the correct patient, procedure, equipment, support staff and site/side marked as required. Patient was prepped and draped in the usual sterile fashion.           Assessment:   Problem List Items Addressed This Visit       Left knee pain - Primary    Relevant Orders    XR knee left 4+ views (Completed)    Osteoarthritis    Relevant Orders    Point of Care Ultrasound (Completed)        Plan: Patient received and tolerated the injection today..  We had offered her the ability to take a look at one of our knee braces here today which she can take if she likes.  Otherwise we discussed utilizing a topical knee compression sleeve for support.  Will provide her some home exercises in lieu of formal physical therapy.  She may call or return with any issues.  Patient would like to follow-up with Dr. Jb Chino going forward however she would like to try gel injections or repeat corticosteroid injections I am happy to see her back to treat her ongoing knee pain.  Orders Placed This Encounter    L Inj/Asp    XR knee left 4+ views    Point of Care Ultrasound      At the conclusion of the visit there were no further questions by the patient/family regarding their plan of care.  Patient was instructed to call or return with any issues, questions, or concerns regarding their  injury and/or treatment plan described above.     09/19/24 at 10:54 PM - Cole C Budinsky, MD    Office: [phone]    This note was prepared using voice recognition software.  The details of this note are correct and have been reviewed, and corrected to the best of my ability.  Some grammatical errors may persist related to the Dragon software.

## 2024-09-19 NOTE — LETTER
September 19, 2024     Patient: Esmer Upton   YOB: 1960   Date of Visit: 9/19/2024       To Whom It May Concern:    It is my medical opinion that Esmer Upton  missed work on 09/18/24 and 09/19/24 .    If you have any questions or concerns, please don't hesitate to call.         Sincerely,        Cole C Budinsky, MD

## 2024-09-20 ENCOUNTER — APPOINTMENT (OUTPATIENT)
Dept: ORTHOPEDIC SURGERY | Facility: CLINIC | Age: 64
End: 2024-09-20
Payer: COMMERCIAL

## 2024-09-25 DIAGNOSIS — M81.0 OSTEOPOROSIS, UNSPECIFIED OSTEOPOROSIS TYPE, UNSPECIFIED PATHOLOGICAL FRACTURE PRESENCE: ICD-10-CM

## 2024-09-25 RX ORDER — ALENDRONATE SODIUM 70 MG/1
70 TABLET ORAL
Qty: 12 TABLET | Refills: 0 | Status: SHIPPED | OUTPATIENT
Start: 2024-09-25 | End: 2024-12-12

## 2024-09-28 ENCOUNTER — HOSPITAL ENCOUNTER (OUTPATIENT)
Dept: RADIOLOGY | Facility: HOSPITAL | Age: 64
Discharge: HOME | End: 2024-09-28
Payer: COMMERCIAL

## 2024-09-28 DIAGNOSIS — M84.462A INSUFFICIENCY FRACTURE OF LEFT TIBIA: ICD-10-CM

## 2024-09-28 PROCEDURE — 73721 MRI JNT OF LWR EXTRE W/O DYE: CPT | Mod: LT

## 2024-09-28 PROCEDURE — 73721 MRI JNT OF LWR EXTRE W/O DYE: CPT | Mod: LEFT SIDE | Performed by: RADIOLOGY

## 2024-10-02 ENCOUNTER — OFFICE VISIT (OUTPATIENT)
Dept: ORTHOPEDIC SURGERY | Facility: CLINIC | Age: 64
End: 2024-10-02
Payer: COMMERCIAL

## 2024-10-02 DIAGNOSIS — M17.12 OSTEOARTHRITIS OF LEFT KNEE, UNSPECIFIED OSTEOARTHRITIS TYPE: ICD-10-CM

## 2024-10-02 DIAGNOSIS — S72.432A DISPLACED FRACTURE OF MEDIAL CONDYLE OF LEFT FEMUR, INITIAL ENCOUNTER FOR CLOSED FRACTURE: Primary | ICD-10-CM

## 2024-10-02 PROCEDURE — 27508 TREATMENT OF THIGH FRACTURE: CPT | Performed by: FAMILY MEDICINE

## 2024-10-02 PROCEDURE — 99214 OFFICE O/P EST MOD 30 MIN: CPT | Performed by: FAMILY MEDICINE

## 2024-10-02 PROCEDURE — 99213 OFFICE O/P EST LOW 20 MIN: CPT | Mod: 57 | Performed by: FAMILY MEDICINE

## 2024-10-02 RX ORDER — HYDROCODONE BITARTRATE AND ACETAMINOPHEN 5; 325 MG/1; MG/1
1 TABLET ORAL EVERY 6 HOURS PRN
Qty: 20 TABLET | Refills: 0 | Status: SHIPPED | OUTPATIENT
Start: 2024-10-02 | End: 2024-10-09

## 2024-10-02 NOTE — LETTER
October 2, 2024     Patient: Esmer Upton   YOB: 1960   Date of Visit: 10/2/2024       To Whom It May Concern:    It is my medical opinion that Esmer Upton  is to remain on light duty for the next 8 weeks for sit down work ONLY.  .    If you have any questions or concerns, please don't hesitate to call.         Sincerely,        Cole C Budinsky, MD

## 2024-10-02 NOTE — PROGRESS NOTES
Established Patient Follow-Up Visit    CC:   Chief Complaint   Patient presents with    Left Knee - Follow-up     MRI REVIEW 9/28/24       HPI:  Esmer is a 64 y.o. female returns here today for follow-up visit regarding: MRI results left knee.  She states significant pain discomfort till to the medial aspect of the knee.  She was able to obtain hinged brace over the last few days she states it is very painful does not really provided much support or improved symptoms.  She is hopeful to return to today if possible.  She denies any numbness tingling or burning denies any additional issues or concerns.  She is currently ongoing 2 weeks of FMLA as they do not have light duty work for her as she works in [employer] and [employer]ilization.          REVIEW OF SYSTEMS:  GENERAL: Negative for malaise, significant weight loss, fever  MUSCULOSKELETAL: See HPI  NEURO: Negative for numbness / tingling       PHYSICAL EXAM:  -Neuro: Gross sensation intact to the lower extremities bilaterally.  -Extremity: Left knee exam unchanged or previous tenderness palpation of the medial femoral condyle soft tissue swelling and a small joint effusion she has full flexion extension about the knee subtle laxity with valgus stress tenderness to the medial joint line.    IMAGING: Previous images reviewed  MR knee left wo IV contrast  Narrative: Interpreted By:  Kim Nelson,   STUDY:  MRI of the left knee with out contrast      INDICATION:  Signs/Symptoms:PAIN      COMPARISON:  Left knee radiographs 09/1924.      ACCESSION NUMBER(S):  [id number]      ORDERING CLINICIAN:  COLE BUDINSKY      TECHNIQUE:  Multiplanar multisequence MRI of the left knee was performed without  intravenous contrast.      FINDINGS:  Menisci:  No meniscal tear.      Cruciate ligaments: Intact.      Collateral ligaments: Intact.      Tendons:  The tendons including the extensor mechanism are intact.      Muscles: Intact.      Bones:  There is focal marrow edema  noted in the medial distal  femoral metaphysis with surrounding periosteal reaction. Degenerative  subchondral cysts are visualized in the patella. Degenerative  marginal osteophytes are noted of the medial and lateral femoral  condyles.      Articular cartilage:  Medial compartment: There is mild thinning of the weight-bearing  medial femoral condyle cartilage. Patellofemoral compartment: There  is high-grade and near full-thickness chondral loss involving the  inferior half of the patellar cartilage with mild thinning of the  superior half patellar cartilage. There is full-thickness chondral  loss involving the superior 3rd trochlear cartilage. Lateral  compartment: Intact.      Miscellaneous: Small joint effusion..      Impression: 1. Focal marrow edema of the distal femoral metaphysis with  surrounding periosteal reaction likely representing insufficiency  fracture.  2. Advanced patellofemoral compartment degenerative changes with  chondral loss as above.  3. Mild medial compartment osteoarthrosis.      MACRO:  None.      Signed by: Kim Nelson 9/28/2024 2:58 PM  Dictation workstation:   [id number]      PROCEDURE: None  Procedures     ASSESSMENT:   Follow-up visit for:  Problem List Items Addressed This Visit       Osteoarthritis    Relevant Orders    Osteoarthritis Knee Brace, Medial, Adjustable     Other Visit Diagnoses       Displaced fracture of medial condyle of left femur, initial encounter for closed fracture    -  Primary    Relevant Medications    HYDROcodone-acetaminophen (Norco) 5-325 mg tablet    Other Relevant Orders    Osteoarthritis Knee Brace, Medial, Adjustable             PLAN: Discussed the nonoperative nature of this injury with the patient at the bedside.  She is agreeable and willing to proceed forward.  Will offer her fit for left knee medial off  brace for her insufficiency fracture.  Recommended vitamin D supplementation 5000 units over-the-counter daily.  For pain we will  offer her short course of Llewellyn.  OARRS was checked and okay for 8 out of 10 pain.  Will plan to see her back in 4 weeks for repeat evaluation repeat x-rays 2 views left knee follow-up to monitor the insufficiency fracture.  She is to call or return sooner with issues in the interim.  He has access to a cane and a wheeled walker that she can utilize for offloading weightbearing as tolerated.  Medial Left Knee Osteoarthritis Bracing  Patient was prescribed a Left knee medial  brace for medial compartment DJD and presence of medial femoral condyle insufficiency fracture.  Physical exam positive for mild laxity with valgus stress.  The patient is ambulatory with or without aid: But, has weakness and instability and/or deformity of their Left knee which requires stabilization from this orthosis to improve their function.    Orders Placed This Encounter    Osteoarthritis Knee Brace, Medial, Adjustable    HYDROcodone-acetaminophen (Norco) 5-325 mg tablet           At the conclusion of the visit there were no further questions by the patient/family regarding their plan of care.  Patient was instructed to call or return with any issues, questions, or concerns regarding their injury and/or treatment plan described above.     10/02/24 at 1:31 PM - Cole C Budinsky, MD    Office: [phone]    This note was prepared using voice recognition software.  The details of this note are correct and have been reviewed, and corrected to the best of my ability.  Some grammatical errors may persist related to the Dragon software.

## 2024-10-03 ENCOUNTER — TELEPHONE (OUTPATIENT)
Dept: ORTHOPEDIC SURGERY | Facility: CLINIC | Age: 64
End: 2024-10-03
Payer: COMMERCIAL

## 2024-10-03 NOTE — TELEPHONE ENCOUNTER
Medial Freestyle Knee Brace: $1404  Coinsurance:85/15  Patient Out of Pocket Cost: $210.60    Individual Deductible:  $300/$300 Met    Individual OOP:  $3000/$1477 Met    Family Deductible:  $600/$300 Met    Family OOP:  $1477 Met

## 2024-10-07 ENCOUNTER — LAB (OUTPATIENT)
Dept: LAB | Facility: LAB | Age: 64
End: 2024-10-07
Payer: COMMERCIAL

## 2024-10-07 ENCOUNTER — APPOINTMENT (OUTPATIENT)
Dept: PRIMARY CARE | Facility: CLINIC | Age: 64
End: 2024-10-07
Payer: COMMERCIAL

## 2024-10-07 VITALS
RESPIRATION RATE: 16 BRPM | HEART RATE: 86 BPM | DIASTOLIC BLOOD PRESSURE: 70 MMHG | OXYGEN SATURATION: 100 % | SYSTOLIC BLOOD PRESSURE: 128 MMHG | BODY MASS INDEX: 35.61 KG/M2 | HEIGHT: 63 IN | WEIGHT: 201 LBS | TEMPERATURE: 97.7 F

## 2024-10-07 DIAGNOSIS — F17.210 SMOKING GREATER THAN 30 PACK YEARS: ICD-10-CM

## 2024-10-07 DIAGNOSIS — Z12.31 ENCOUNTER FOR SCREENING MAMMOGRAM FOR MALIGNANT NEOPLASM OF BREAST: ICD-10-CM

## 2024-10-07 DIAGNOSIS — Z00.00 HEALTHCARE MAINTENANCE: ICD-10-CM

## 2024-10-07 DIAGNOSIS — E55.9 VITAMIN D DEFICIENCY: ICD-10-CM

## 2024-10-07 DIAGNOSIS — I10 BENIGN ESSENTIAL HYPERTENSION: Primary | ICD-10-CM

## 2024-10-07 LAB
25(OH)D3 SERPL-MCNC: 29 NG/ML (ref 30–100)
ALBUMIN SERPL BCP-MCNC: 3.8 G/DL (ref 3.4–5)
ALP SERPL-CCNC: 122 U/L (ref 33–136)
ALT SERPL W P-5'-P-CCNC: 15 U/L (ref 7–45)
ANION GAP SERPL CALC-SCNC: 14 MMOL/L (ref 10–20)
AST SERPL W P-5'-P-CCNC: 16 U/L (ref 9–39)
BASOPHILS # BLD AUTO: 0.08 X10*3/UL (ref 0–0.1)
BASOPHILS NFR BLD AUTO: 0.8 %
BILIRUB SERPL-MCNC: 0.3 MG/DL (ref 0–1.2)
BUN SERPL-MCNC: 9 MG/DL (ref 6–23)
CALCIUM SERPL-MCNC: 9.6 MG/DL (ref 8.6–10.6)
CHLORIDE SERPL-SCNC: 105 MMOL/L (ref 98–107)
CHOLEST SERPL-MCNC: 227 MG/DL (ref 0–199)
CHOLESTEROL/HDL RATIO: 3.7
CO2 SERPL-SCNC: 27 MMOL/L (ref 21–32)
CREAT SERPL-MCNC: 0.93 MG/DL (ref 0.5–1.05)
EGFRCR SERPLBLD CKD-EPI 2021: 69 ML/MIN/1.73M*2
EOSINOPHIL # BLD AUTO: 0.27 X10*3/UL (ref 0–0.7)
EOSINOPHIL NFR BLD AUTO: 2.8 %
ERYTHROCYTE [DISTWIDTH] IN BLOOD BY AUTOMATED COUNT: 13.8 % (ref 11.5–14.5)
GLUCOSE SERPL-MCNC: 92 MG/DL (ref 74–99)
HCT VFR BLD AUTO: 43.4 % (ref 36–46)
HDLC SERPL-MCNC: 61.2 MG/DL
HGB BLD-MCNC: 13.9 G/DL (ref 12–16)
IMM GRANULOCYTES # BLD AUTO: 0.06 X10*3/UL (ref 0–0.7)
IMM GRANULOCYTES NFR BLD AUTO: 0.6 % (ref 0–0.9)
LDLC SERPL CALC-MCNC: 148 MG/DL
LYMPHOCYTES # BLD AUTO: 2.56 X10*3/UL (ref 1.2–4.8)
LYMPHOCYTES NFR BLD AUTO: 26.9 %
MCH RBC QN AUTO: 28.9 PG (ref 26–34)
MCHC RBC AUTO-ENTMCNC: 32 G/DL (ref 32–36)
MCV RBC AUTO: 90 FL (ref 80–100)
MONOCYTES # BLD AUTO: 0.51 X10*3/UL (ref 0.1–1)
MONOCYTES NFR BLD AUTO: 5.4 %
NEUTROPHILS # BLD AUTO: 6.03 X10*3/UL (ref 1.2–7.7)
NEUTROPHILS NFR BLD AUTO: 63.5 %
NON HDL CHOLESTEROL: 166 MG/DL (ref 0–149)
NRBC BLD-RTO: 0 /100 WBCS (ref 0–0)
PLATELET # BLD AUTO: 333 X10*3/UL (ref 150–450)
POTASSIUM SERPL-SCNC: 4.5 MMOL/L (ref 3.5–5.3)
PROT SERPL-MCNC: 6.3 G/DL (ref 6.4–8.2)
RBC # BLD AUTO: 4.81 X10*6/UL (ref 4–5.2)
SODIUM SERPL-SCNC: 141 MMOL/L (ref 136–145)
TRIGL SERPL-MCNC: 88 MG/DL (ref 0–149)
TSH SERPL-ACNC: 2.01 MIU/L (ref 0.44–3.98)
VLDL: 18 MG/DL (ref 0–40)
WBC # BLD AUTO: 9.5 X10*3/UL (ref 4.4–11.3)

## 2024-10-07 PROCEDURE — 3078F DIAST BP <80 MM HG: CPT | Performed by: INTERNAL MEDICINE

## 2024-10-07 PROCEDURE — 93000 ELECTROCARDIOGRAM COMPLETE: CPT | Performed by: INTERNAL MEDICINE

## 2024-10-07 PROCEDURE — 82306 VITAMIN D 25 HYDROXY: CPT

## 2024-10-07 PROCEDURE — 3008F BODY MASS INDEX DOCD: CPT | Performed by: INTERNAL MEDICINE

## 2024-10-07 PROCEDURE — 80061 LIPID PANEL: CPT

## 2024-10-07 PROCEDURE — 99396 PREV VISIT EST AGE 40-64: CPT | Performed by: INTERNAL MEDICINE

## 2024-10-07 PROCEDURE — 85025 COMPLETE CBC W/AUTO DIFF WBC: CPT

## 2024-10-07 PROCEDURE — 36415 COLL VENOUS BLD VENIPUNCTURE: CPT

## 2024-10-07 PROCEDURE — 84443 ASSAY THYROID STIM HORMONE: CPT

## 2024-10-07 PROCEDURE — 3074F SYST BP LT 130 MM HG: CPT | Performed by: INTERNAL MEDICINE

## 2024-10-07 PROCEDURE — 80053 COMPREHEN METABOLIC PANEL: CPT

## 2024-10-07 ASSESSMENT — ENCOUNTER SYMPTOMS
FREQUENCY: 0
CONSTIPATION: 0
ABDOMINAL PAIN: 0
DIARRHEA: 0

## 2024-10-07 ASSESSMENT — PATIENT HEALTH QUESTIONNAIRE - PHQ9
2. FEELING DOWN, DEPRESSED OR HOPELESS: NOT AT ALL
1. LITTLE INTEREST OR PLEASURE IN DOING THINGS: NOT AT ALL
SUM OF ALL RESPONSES TO PHQ9 QUESTIONS 1 AND 2: 0

## 2024-10-07 NOTE — PROGRESS NOTES
Patient here for a physical     Subjective   Patient ID: Esmer Upton is a 64 y.o. female who presents for Annual Exam.    The patient recently underwent an MR of the Knee which showed focal marrow edema of the distal femoral metaphysis with surrounding periosteal reaction likely representing insufficiency fracture.  She was fit for a left knee medial  brace, and is waiting for the device to be prepared.  There has been no improvement in the pain, and is taking Norco as up to 1 tablet every six hours as needed.   She is not sure how the injury happened, and is maintained with alendronate 70mg once weekly for the last two years.  The patient is currently ambulating with a cane, and finds that this is helping. She is following with Dr.Budinsky from Orthopedic Surgery, and plans to start Vitamin D supplementation today.      The patient is following with  from Rheumatology for a history of osteoporosis and osteoarthritis annually in August.    The patient continues to take pregabalin to 75 mg twice daily, and finds that this is working well for lumbar radiculopathy.    The patient denies any hearing impairment or vision changes.  She denies any abdominal pain, bowel problems, or significant urinary symptoms.     The patient has been smoking since 1976, and has significantly cut down on daily intake.  She is considering trying acupuncture to help stop smoking.  The patient is unsure whether she has taken bupropion in the past.      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       Review of Systems   HENT:  Negative for hearing loss.    Eyes:  Negative for visual disturbance.   Gastrointestinal:  Negative for abdominal pain, constipation and diarrhea.   Genitourinary:  Negative for frequency.   Musculoskeletal:         Positive for left knee pain.     Objective   Physical Exam  Constitutional:       Appearance: Normal appearance.   Neck:      Vascular: No  carotid bruit.   Cardiovascular:      Rate and Rhythm: Normal rate and regular rhythm.      Heart sounds: Normal heart sounds.   Pulmonary:      Effort: Pulmonary effort is normal.      Breath sounds: Normal breath sounds.   Abdominal:      General: Bowel sounds are normal.      Palpations: Abdomen is soft.      Tenderness: There is no abdominal tenderness.   Skin:     General: Skin is warm and dry.   Neurological:      General: No focal deficit present.      Mental Status: She is alert and oriented to person, place, and time. Mental status is at baseline.   Psychiatric:         Mood and Affect: Mood normal.         Behavior: Behavior normal.       Assessment/Plan   Problem List Items Addressed This Visit             ICD-10-CM    Benign essential hypertension - Primary I10    Relevant Orders    ECG 12 lead (Clinic Performed) (Completed)    Smoking greater than 30 pack years F17.210    Relevant Orders    CT lung screening low dose    Vitamin D deficiency E55.9    Relevant Orders    Vitamin D 25-Hydroxy,Total (for eval of Vitamin D levels)     Other Visit Diagnoses         Codes    Healthcare maintenance     Z00.00    Relevant Orders    CBC and Auto Differential    Comprehensive metabolic panel    Lipid panel    Tsh With Reflex To Free T4 If Abnormal    Encounter for screening mammogram for malignant neoplasm of breast     Z12.31    Relevant Orders    BI mammo bilateral screening tomosynthesis            CPE Performed  -  Discussed healthy diet and regular exercise.    -  Physical exam overall unremarkable. Immunizations reviewed and updated accordingly. Healthy lifestyle choices discussed (tobacco avoidance, appropriate alcohol use, avoidance of illicit substances).   -  Patient is wearing seatbelt.   -  Screening lab work ordered as indicated.    -  Age appropriate screening tests reviewed with patient.        EKG unremarkable compared to previous.     IMPRESSION/PLAN:      History of Smoking   - Last CT Chest  12/2022 showed few stable small bilateral noncalcified pulmonary nodules measuring up to 6 mm, likely benign.  12/2023 repeat CT showed Mid to upper lung zone predominant reticular and ground-glass changes appear grossly stable, potentially postinfectious/inflammatory sequelae, although could be in the spectrum of smoking-related lung disease.  Lung nodules stable.  Ordered repeat for 2024.  Discussed possibly starting bupropion, and patient will consider for now.     /70 in the office today.     Insomnia   - Symptoms ongoing.  Advised the patient to try Sleep 3 Nature's Bounty over the counter.  Maintained with trazodone 100mg at bedtime.      Carpel Tunnel   - Stable. Continue wearing wrist splints to immobilize while sleeping. Call if symptoms worsen.      Bilateral Thoracic Back Pain  - Worsening per last Xray 7/2023.  Advised patient to follow-up with  from Orthopedics-Spine or  from Neurosurgery soon.  Previously on tramadol 50 mg up to TID as needed.  Patient also okay to try applying lidocaine patches to the area and continue with pregabalin to 75 mg twice daily as below. Following with  from Pain Management at [facility].    Lumbar Radiculopathy  - Followed with Dr. Liang in Orthopedics, and Dr. Schneider in Pain Medicine.  Currently following with  from Pain Management at [facility].    Left Hip Arthritis   - s/p Total hip replacement on 3/21/2023 with  from Orthopedic Surgery.     Left Knee Pain  - Symptoms reproduced with extension.  Followed up with Dr.Budinsky from Orthopedic Surgery.  MR Knee showed Focal marrow edema of the distal femoral metaphysis with surrounding periosteal reaction likely representing insufficiency fracture.       Right foot and Bilateral Hip pain   - Increase pregabalin 75 mg BID Following with  in Orthopedic Surgery.     Osteoporosis   - Continue with Alendronate 70 mg as 1 tablet every week 30 to 60 minutes before  breakfast on an empty stomach. Do not lie down after taking medication.     Health Maintenance   - Routine labs ordered including CBC, CMP, and a lipid panel to be completed in the fasting state. Added TSH.  Last PAP 6/2021. Last Mammogram 12/2023, ordered repeat for 2024. Last colonoscopy performed 12/2022, repeat due 12/2025.     Follow up in 6 months, call sooner if needed.       Scribe Attestation  By signing my name below, I, Annetta Linda, [place]   attest that this documentation has been prepared under the direction and in the presence of Papo Duong DO.   Annetta Linda 10/07/24 11:15 AM

## 2024-10-08 DIAGNOSIS — E55.9 VITAMIN D DEFICIENCY: Primary | ICD-10-CM

## 2024-10-08 RX ORDER — ERGOCALCIFEROL 1.25 MG/1
50000 CAPSULE ORAL WEEKLY
Qty: 12 CAPSULE | Refills: 0 | Status: SHIPPED | OUTPATIENT
Start: 2024-10-08 | End: 2024-12-31

## 2024-10-09 ENCOUNTER — APPOINTMENT (OUTPATIENT)
Dept: ORTHOPEDIC SURGERY | Facility: CLINIC | Age: 64
End: 2024-10-09
Payer: COMMERCIAL

## 2024-10-10 ENCOUNTER — TELEPHONE (OUTPATIENT)
Dept: ORTHOPEDIC SURGERY | Facility: CLINIC | Age: 64
End: 2024-10-10
Payer: COMMERCIAL

## 2024-10-10 NOTE — TELEPHONE ENCOUNTER
PT Called Bracing Department and is Okay with the Precert cost for Medial OA Brace. Will Order this week ans call when it is in stock

## 2024-10-11 ENCOUNTER — APPOINTMENT (OUTPATIENT)
Facility: CLINIC | Age: 64
End: 2024-10-11
Payer: COMMERCIAL

## 2024-10-14 ENCOUNTER — OFFICE VISIT (OUTPATIENT)
Dept: NEUROSURGERY | Facility: CLINIC | Age: 64
End: 2024-10-14
Payer: COMMERCIAL

## 2024-10-14 VITALS
DIASTOLIC BLOOD PRESSURE: 79 MMHG | HEIGHT: 64 IN | WEIGHT: 200 LBS | BODY MASS INDEX: 34.15 KG/M2 | SYSTOLIC BLOOD PRESSURE: 119 MMHG

## 2024-10-14 DIAGNOSIS — G89.29 CHRONIC MIDLINE THORACIC BACK PAIN: Primary | ICD-10-CM

## 2024-10-14 DIAGNOSIS — Z71.89 HISTORY OF PARTICIPATION IN SMOKING CESSATION COUNSELING: ICD-10-CM

## 2024-10-14 DIAGNOSIS — M54.6 CHRONIC MIDLINE THORACIC BACK PAIN: Primary | ICD-10-CM

## 2024-10-14 PROCEDURE — 3078F DIAST BP <80 MM HG: CPT | Performed by: PHYSICIAN ASSISTANT

## 2024-10-14 PROCEDURE — 3074F SYST BP LT 130 MM HG: CPT | Performed by: PHYSICIAN ASSISTANT

## 2024-10-14 PROCEDURE — 3008F BODY MASS INDEX DOCD: CPT | Performed by: PHYSICIAN ASSISTANT

## 2024-10-14 PROCEDURE — 99213 OFFICE O/P EST LOW 20 MIN: CPT | Performed by: PHYSICIAN ASSISTANT

## 2024-10-14 ASSESSMENT — PATIENT HEALTH QUESTIONNAIRE - PHQ9
1. LITTLE INTEREST OR PLEASURE IN DOING THINGS: MORE THAN HALF THE DAYS
2. FEELING DOWN, DEPRESSED OR HOPELESS: NOT AT ALL
10. IF YOU CHECKED OFF ANY PROBLEMS, HOW DIFFICULT HAVE THESE PROBLEMS MADE IT FOR YOU TO DO YOUR WORK, TAKE CARE OF THINGS AT HOME, OR GET ALONG WITH OTHER PEOPLE: SOMEWHAT DIFFICULT
SUM OF ALL RESPONSES TO PHQ9 QUESTIONS 1 & 2: 2

## 2024-10-14 ASSESSMENT — PAIN SCALES - GENERAL: PAINLEVEL: 2

## 2024-10-14 NOTE — PROGRESS NOTES
[facility] [place]  Department of Neurological Surgery  Established Patient Visit    History of Present Illness  Esmer Upton is a 64 y.o. year old female who presents to the spine clinic in follow up with midline thoracic back pain.  Symptoms are centered at approximately T7 and axial in nature without lateral radiation.  She states that she has been out of work for the past few months after identification of left knee fracture.  During this time and overall rest her back pain has improved and currently rated at approximately a 2/10.  Does not use over-the-counter pain medications this time.  Denies any changes in bowel or bladder or balance.        Patient's BMI is Body mass index is 34.33 kg/m².    14/14 systems reviewed and negative other than what is listed in the history of present illness    Patient Active Problem List   Diagnosis    Abnormal mammogram    Acute UTI    Anxiety    Arthritis of midfoot    Benign essential hypertension    Carpal tunnel syndrome    Chronic obstructive pulmonary disease (Multi)    Elevated alkaline phosphatase level    Elevated parathyroid hormone    Elevated white blood cell count    Fibrocystic breast disease (FCBD)    Insomnia    Internal derangement of left knee    Joint pain, knee    Knee sprain    Left knee pain    Lumbar radiculitis    Lumbar spondylosis    Myofascial pain syndrome    Osteoporosis    Plantar fasciitis, right    Arthropathy    Osteoarthritis of multiple joints    Osteoarthritis    Primary localized osteoarthritis of left knee    Primary osteoarthritis, unspecified ankle and foot    Primary localized osteoarthrosis of pelvic region    Right flank pain    Hip pain, right    Right foot pain    Sacral insufficiency fracture    Sacroiliitis (CMS-HCC)    Skin lesion    Smoking greater than 30 pack years    Status post left hip replacement    Stress reaction    Thigh numbness    Back pain    Greater trochanteric bursitis of left hip    Low  back pain    Trochanteric bursitis    Vitamin D deficiency    Primary localized osteoarthritis of knee    Arthralgia of hip    Lower back pain     Past Medical History:   Diagnosis Date    Personal history of other diseases of the nervous system and sense organs 10/17/2016    History of acute otitis media    Personal history of other diseases of the respiratory system 12/19/2019    History of sinusitis    Trochanteric bursitis, unspecified hip 01/07/2014    Trochanteric bursitis    Urinary tract infection, site not specified 12/11/2015    Bacterial UTI     Past Surgical History:   Procedure Laterality Date    COLONOSCOPY  11/12/2012    Complete Colonoscopy    FOOT SURGERY  03/04/2016    Foot Surgery    GALLBLADDER SURGERY  11/12/2012    Gallbladder Surgery    HIP SURGERY  01/07/2014    Hip Surgery    HIP SURGERY  01/07/2013    Hip Surgery    HIP SURGERY  01/07/2013    Hip Surgery    OTHER SURGICAL HISTORY  06/24/2021    Endometrial ablation    XR CHEST PACEMAKER WITH FLUORO  10/31/2012    XR CHEST PACEMAKER WITH FLUORO 10/31/2012 [facility]     Social History     Tobacco Use    Smoking status: Every Day     Types: Cigarettes    Smokeless tobacco: Never   Substance Use Topics    Alcohol use: Never     family history includes Arthritis in her mother; Diabetes in her mother; Lung cancer in her father; No Known Problems in her sister.    Current Outpatient Medications:     alendronate (Fosamax) 70 mg tablet, Take 1 tablet (70 mg) by mouth every 7 days for 12 doses. TAKE 1 TABLET BY MOUTH EVERY WEEK 30 TO 60 MINUTES PRIOR TO BREAKFASTON AN EMPTY STOMACH. DO NOT LIE DOWN AFTER TAKING MEDICATION, Disp: 12 tablet, Rfl: 0    cyclobenzaprine (Flexeril) 10 mg tablet, Take 1 tablet (10 mg) by mouth every 8 hours if needed for pain., Disp: , Rfl:     ergocalciferol (Vitamin D-2) 1.25 MG (04866 UT) capsule, Take 1 capsule (50,000 Units) by mouth 1 (one) time per week., Disp: 12 capsule, Rfl: 0    ibuprofen 800 mg tablet,  Take 1 tablet (800 mg) by mouth every 8 hours if needed for mild pain (1 - 3)., Disp: , Rfl:     lidocaine (Lidoderm) 5 % patch, Place 1 patch over 12 hours on the skin once daily. APPLY 1 PATCH TO THE AFFECTED AREA AND LEAVE IN PLACE FOR 12 HOURS, THEN REMOVE AND LEAVE OFF FOR 12 HOURS., Disp: 30 patch, Rfl: 1    pregabalin (Lyrica) 75 mg capsule, Take 1 capsule (75 mg) by mouth 2 times a day., Disp: 60 capsule, Rfl: 3    traZODone (Desyrel) 100 mg tablet, Take 1 tablet (100 mg) by mouth once daily at bedtime., Disp: 90 tablet, Rfl: 3  No Known Allergies    Physical Examination:    General: Well developed, awake/alert/oriented x3, no distress, alert and cooperative  Skin: Warm and dry, no lesions, no rashes  ENMT: Mucous membranes moist, no apparent injury, no lesions seen  Head/Neck: Neck Supple, no apparent injury  Respiratory/Thorax: Normal breath sounds with good chest expansion, thorax symmetric  Cardiovascular: No pitting edema, no JVD    Motor Strength: Moving all extremities antigravity with good strength  Muscle Bulk: Normal and symmetric in all extremities    Posture:   -- Cervical: Normal  -- Thoracic: Normal  -- Lumbar : Normal  Paraspinal muscle spasm/tenderness absent.   Midline tenderness minimal      Sensation: intact to light touch posterior torso      Results:  I personally reviewed and interpreted the imaging results which included CT chest for lung cancer screening showed mild disc bulge with no vertebral fracture identified    Assessment and Plan:  Esmer Upton is a 64 y.o. year old female who presents to the spine clinic in follow up with midline thoracic back pain.  Symptoms are centered at approximately T7 and axial in nature without lateral radiation.  She states that she has been out of work for the past few months after identification of left knee fracture.  During this time and overall rest her back pain has improved and currently rated at approximately a 2/10.  Does not use  over-the-counter pain medications this time.  Denies any changes in bowel or bladder or balance.  As patient is improving we will hold on MRI order.  X-rays ordered if patient has an acute flareup otherwise we will plan to await results of upcoming repeat CT chest for lung cancer screening.  Have recommended aquatic or land PT sessions which may be performed from waist up.  Have also discussed smoking cessation for improvement of bone health in general.  Latest DEXA in June of this year shows osteopenia however patient has history of sacral insufficiency fracture and current nontraumatic knee insufficiency fracture.        The above clinical summary has been dictated with voice recognition software. It has not been proofread for grammatical errors, typographical mistakes, or other semantic inconsistencies.    Thank you for visiting our office today. It was our pleasure to take part in your healthcare.     Do not hesitate to call with any questions regarding your plan of care after leaving at [phone] M-F 8am-4pm.     To clinicians, thank you very much for this kind referral. It is a privilege to partner with you in the care of your patients. My office would be delighted to assist you with any further consultations or with questions regarding the plan of care outlined. Do not hesitate to call the office or contact me directly.       Sincerely,      RAJESH Yu, PA-C  Associate Physician Assistant, Neurosurgery  Clinical   [facility] [employer] [facility]    [place] [address] [address] [place] [id number]    Phone: [phone]  Fax: [phone]

## 2024-10-15 ENCOUNTER — TELEPHONE (OUTPATIENT)
Dept: ORTHOPEDIC SURGERY | Facility: CLINIC | Age: 64
End: 2024-10-15
Payer: COMMERCIAL

## 2024-10-15 NOTE — TELEPHONE ENCOUNTER
LVM to PT, knee brace is here in stock in [facility]. Left Bracing dept. # for PT to callback

## 2024-10-16 ENCOUNTER — APPOINTMENT (OUTPATIENT)
Dept: ORTHOPEDIC SURGERY | Facility: CLINIC | Age: 64
End: 2024-10-16
Payer: COMMERCIAL

## 2024-10-16 ENCOUNTER — TELEPHONE (OUTPATIENT)
Dept: ORTHOPEDIC SURGERY | Facility: CLINIC | Age: 64
End: 2024-10-16
Payer: COMMERCIAL

## 2024-10-28 ENCOUNTER — HOSPITAL ENCOUNTER (OUTPATIENT)
Dept: RADIOLOGY | Facility: CLINIC | Age: 64
Discharge: HOME | End: 2024-10-28
Payer: COMMERCIAL

## 2024-10-28 ENCOUNTER — OFFICE VISIT (OUTPATIENT)
Dept: ORTHOPEDIC SURGERY | Facility: CLINIC | Age: 64
End: 2024-10-28
Payer: COMMERCIAL

## 2024-10-28 DIAGNOSIS — S72.432A DISPLACED FRACTURE OF MEDIAL CONDYLE OF LEFT FEMUR, INITIAL ENCOUNTER FOR CLOSED FRACTURE: ICD-10-CM

## 2024-10-28 PROCEDURE — 73560 X-RAY EXAM OF KNEE 1 OR 2: CPT | Mod: LEFT SIDE | Performed by: FAMILY MEDICINE

## 2024-10-28 PROCEDURE — 73560 X-RAY EXAM OF KNEE 1 OR 2: CPT | Mod: LT

## 2024-10-28 PROCEDURE — 99211 OFF/OP EST MAY X REQ PHY/QHP: CPT | Performed by: FAMILY MEDICINE

## 2024-11-13 ENCOUNTER — HOSPITAL ENCOUNTER (OUTPATIENT)
Dept: RADIOLOGY | Facility: CLINIC | Age: 64
Discharge: HOME | End: 2024-11-13
Payer: COMMERCIAL

## 2024-11-13 ENCOUNTER — OFFICE VISIT (OUTPATIENT)
Dept: ORTHOPEDIC SURGERY | Facility: CLINIC | Age: 64
End: 2024-11-13
Payer: COMMERCIAL

## 2024-11-13 ENCOUNTER — HOSPITAL ENCOUNTER (OUTPATIENT)
Dept: RADIOLOGY | Facility: EXTERNAL LOCATION | Age: 64
Discharge: HOME | End: 2024-11-13

## 2024-11-13 DIAGNOSIS — M71.22 SYNOVIAL CYST OF LEFT POPLITEAL SPACE: Primary | ICD-10-CM

## 2024-11-13 DIAGNOSIS — S72.432A DISPLACED FRACTURE OF MEDIAL CONDYLE OF LEFT FEMUR, INITIAL ENCOUNTER FOR CLOSED FRACTURE: ICD-10-CM

## 2024-11-13 PROCEDURE — 20611 DRAIN/INJ JOINT/BURSA W/US: CPT | Mod: LT | Performed by: FAMILY MEDICINE

## 2024-11-13 PROCEDURE — 73560 X-RAY EXAM OF KNEE 1 OR 2: CPT | Mod: LT

## 2024-11-13 PROCEDURE — 99211 OFF/OP EST MAY X REQ PHY/QHP: CPT | Mod: 25 | Performed by: FAMILY MEDICINE

## 2024-11-13 NOTE — PROGRESS NOTES
Established Patient Follow-Up Visit    CC:   Chief Complaint   Patient presents with    Left Knee - Follow-up     Displaced fx medial condyle Lt femur  Xray today        HPI:  Esmer is a 64 y.o. female returns here today for follow-up visit regarding: Follow-up of her medial femoral condyle and proximal medial tibial plateau fracture.  She states some increased swelling to the back of the leg related to likely which she feels is her Baker's cyst.  She has been compliant with her brace but does not feel that it is providing a whole lot of extra support.  He is hopeful to begin returning to work without restriction on 1125.  She states she will not be able to wear her off  brace at work but she will be able to wear a simple hinged knee brace.  She would like me to evaluate her back and medial side of the knee for further potential aspiration attempt.          REVIEW OF SYSTEMS:  GENERAL: Negative for malaise, significant weight loss, fever  MUSCULOSKELETAL: See HPI  NEURO: Negative for numbness / tingling       PHYSICAL EXAM:  -Neuro: Gross sensation intact to the lower extremities bilaterally.  -Extremity: Left knee exam demonstrates mild soft tissue tenderness and minimal discomfort to the medial femoral condyle and the medial tibial plateau.  She has more significant posterior pain and soft tissue discomfort.  Calf is largely soft and nontender she has no pain about the Achilles.  There is no redness or erythema she ambulates with very minimal antalgic gait.    IMAGING: Repeat x-rays today demonstrate interval sclerotic callus formation and healing of the medial femoral condyle and medial tibial plateau insufficiency fractures.  Point of Care Ultrasound  These images are not reportable by radiology and will not be interpreted   by  Radiologists.      PROCEDURE: Baker's cyst aspiration attempt as below   L Inj/Asp: L knee (Popliteal fossa) on 11/13/2024 6:46 PM  Indications: pain and joint  swelling  Details: 18 G needle, ultrasound-guided posterior approach  Aspirate: 0 mL  Outcome: tolerated well, no immediate complications    No aspirate able to be obtained.  Question parameniscal cyst versus very small Baker's cyst with thick synovial contents unable to be aspirated.  Procedure, treatment alternatives, risks and benefits explained, specific risks discussed. Consent was given by the patient. Immediately prior to procedure a time out was called to verify the correct patient, procedure, equipment, support staff and site/side marked as required. Patient was prepped and draped in the usual sterile fashion.            ASSESSMENT:   Follow-up visit for:  Problem List Items Addressed This Visit    None  Visit Diagnoses       Synovial cyst of left popliteal space    -  Primary    Relevant Orders    Point of Care Ultrasound (Completed)    Displaced fracture of medial condyle of left femur, initial encounter for closed fracture        Relevant Orders    XR knee left 1-2 views             PLAN: Unfortunately for the patient we are unable to aspirate the very small amount of potential Baker's cyst versus medial meniscal cyst contents.  This was all located posterior medial aspect of the knee.  We were able to get our bracing department to look at her brace and provide some recommendations for fit and support.  She was able to identify some areas that she can improve with the brace.  We will provide her with a return to work note as per her request began on 1125.  Will plan to see her back in 4 to 6 weeks for repeat evaluation repeat x-rays 2 views of the right knee at that time.  Orders Placed This Encounter    L Inj/Asp    XR knee left 1-2 views    Point of Care Ultrasound           At the conclusion of the visit there were no further questions by the patient/family regarding their plan of care.  Patient was instructed to call or return with any issues, questions, or concerns regarding their injury and/or  treatment plan described above.     11/13/24 at 6:49 PM - Cole C Budinsky, MD    Office: [phone]    This note was prepared using voice recognition software.  The details of this note are correct and have been reviewed, and corrected to the best of my ability.  Some grammatical errors may persist related to the Dragon software.

## 2024-11-13 NOTE — LETTER
November 13, 2024     Patient: Esmer Upton   YOB: 1960   Date of Visit: 11/13/2024       To Whom It May Concern:    It is my medical opinion that Esmer Upton may return to work on 11/25/2024 with no restrictions .    If you have any questions or concerns, please don't hesitate to call.         Sincerely,        Cole C Budinsky, MD

## 2024-11-13 NOTE — LETTER
November 13, 2024     Patient: Esmer Upton   YOB: 1960   Date of Visit: 11/13/2024       To Whom It May Concern:    It is my medical opinion that Esmer Upton may return to work on 11/26/2024 with no restrictions .    If you have any questions or concerns, please don't hesitate to call.       Sincerely,        Cole C Budinsky, MD

## 2024-12-13 ENCOUNTER — HOSPITAL ENCOUNTER (OUTPATIENT)
Dept: RADIOLOGY | Facility: CLINIC | Age: 64
Discharge: HOME | End: 2024-12-13
Payer: COMMERCIAL

## 2024-12-13 ENCOUNTER — OFFICE VISIT (OUTPATIENT)
Dept: ORTHOPEDIC SURGERY | Facility: CLINIC | Age: 64
End: 2024-12-13
Payer: COMMERCIAL

## 2024-12-13 DIAGNOSIS — S72.432A DISPLACED FRACTURE OF MEDIAL CONDYLE OF LEFT FEMUR, INITIAL ENCOUNTER FOR CLOSED FRACTURE: ICD-10-CM

## 2024-12-13 DIAGNOSIS — M71.22 SYNOVIAL CYST OF LEFT POPLITEAL SPACE: ICD-10-CM

## 2024-12-13 PROCEDURE — 73560 X-RAY EXAM OF KNEE 1 OR 2: CPT | Mod: LT

## 2024-12-13 PROCEDURE — 99211 OFF/OP EST MAY X REQ PHY/QHP: CPT | Performed by: FAMILY MEDICINE

## 2024-12-13 RX ORDER — METHYLPREDNISOLONE 4 MG/1
TABLET ORAL
Qty: 21 TABLET | Refills: 0 | Status: SHIPPED | OUTPATIENT
Start: 2024-12-13

## 2024-12-13 NOTE — PROGRESS NOTES
Established Patient Follow-Up Visit    CC:   Chief Complaint   Patient presents with    Left Knee - Follow-up     Synovial cyst left popliteal space   S/p inj 11/13/24  Xray today        HPI:  Esmer is a 64 y.o. female returns here today for follow-up visit regarding: Follow-up left knee insufficiency fractures and OA.  She currently is able to use her regular simple hinged knee brace the off  brace does help a little bit more when she is not at work.  She is not able to utilize the off  brace while at work she does currently work on her feet 8 to 10 hours with her night shift.  She is tolerating ibuprofen 800 mg twice a day she also has Celebrex and meloxicam she is wondering if she should modify her changes.  She would like to consider going forward with gel injections.          REVIEW OF SYSTEMS:  GENERAL: Negative for malaise, significant weight loss, fever  MUSCULOSKELETAL: See HPI  NEURO: Negative for numbness / tingling       PHYSICAL EXAM:  -Neuro: Gross sensation intact to the lower extremities bilaterally.  -Extremity: Left knee exam: Tenderness over the medial femoral condyle medial joint line no lateral joint line pain mild patellar crepitus full flexion extension about the knee calf soft and nontender    IMAGING: Repeat x-rays today as below  XR knee left 1-2 views  Narrative: Interpreted By:  Budinsky, Cole,   STUDY:  XR KNEE LEFT 1-2 VIEWS; ;  12/13/2024 8:34 am      INDICATION:  Signs/Symptoms:pain.      ACCESSION NUMBER(S):  [id number]      ORDERING CLINICIAN:  COLE BUDINSKY      Impression: Three views left knee demonstrate stable appearing medial and lateral  joint spaces with stable medial tibial plateau and medial femoral  condyle and distal fibular insufficiency fractures. No presence for  any obvious new or additional bony abnormality.          Signed by: Cole Budinsky 12/13/2024 6:21 PM  Dictation workstation:   [id number]      PROCEDURE: None  Procedures     ASSESSMENT:    Follow-up visit for:  Problem List Items Addressed This Visit    None  Visit Diagnoses       Synovial cyst of left popliteal space        Relevant Medications    methylPREDNISolone (Medrol Dospak) 4 mg tablets    Other Relevant Orders    XR knee left 1-2 views (Completed)    Displaced fracture of medial condyle of left femur, initial encounter for closed fracture        Relevant Medications    methylPREDNISolone (Medrol Dospak) 4 mg tablets    Other Relevant Orders    XR knee left 1-2 views (Completed)             PLAN: At this time we will submit for gel injections for her left knee.  We will offer her an oral steroid pack for now we will hold off on steroid injections as these never really worked for her.  She denied the need for different work excuse or notes.  We also discussed the possibility of partial knee replacement going forward should she fail the gel injections.  We can discuss this at further length and she would likely do excellent with Dr. Jad Schaeffer for medial partial knee replacement as she is status post a left hip replacement by Dr. Jb Chino.  I am uncertain if Dr. Jb Chino performs or shall arthroplasties, obviously she can follow-up with Dr. Jb Chino if he is able to perform that for her.  Will see her back for gel injections to the left knee.    In conclusion, this patient has osteoarthritis of the knee which is symptomatic.  This is causing significant morning stiffness lasting over an hour.  The patient has popping clicking and grinding in the knee with range of motion that is decreased and gets worse with prolonged standing or going up and down stairs.  This affects functional activities and activities of daily living.  There is radiographic evidence of osteoarthritis with joint space narrowing and marginal osteophyte formation.  This patient has also failed nonpharmacologic treatment for osteoarthritis including attempts at weight loss, and a home exercise program with or  without physical therapy.  The patient has also failed pharmacologic treatments for osteoarthritis including over-the-counter analgesics, anti-inflammatory medication as well as injectable treatments.  For these reasons I feel the patient is a candidate for Visco supplementation injections of the knee.  Orders Placed This Encounter    XR knee left 1-2 views    methylPREDNISolone (Medrol Dospak) 4 mg tablets           At the conclusion of the visit there were no further questions by the patient/family regarding their plan of care.  Patient was instructed to call or return with any issues, questions, or concerns regarding their injury and/or treatment plan described above.     12/15/24 at 1:23 PM - Cole C Budinsky, MD    Office: [phone]    This note was prepared using voice recognition software.  The details of this note are correct and have been reviewed, and corrected to the best of my ability.  Some grammatical errors may persist related to the Dragon software.

## 2024-12-16 DIAGNOSIS — M81.0 OSTEOPOROSIS, UNSPECIFIED OSTEOPOROSIS TYPE, UNSPECIFIED PATHOLOGICAL FRACTURE PRESENCE: ICD-10-CM

## 2024-12-16 RX ORDER — ALENDRONATE SODIUM 70 MG/1
70 TABLET ORAL
Qty: 12 TABLET | Refills: 0 | Status: SHIPPED | OUTPATIENT
Start: 2024-12-16 | End: 2025-03-04

## 2024-12-30 ENCOUNTER — HOSPITAL ENCOUNTER (OUTPATIENT)
Dept: RADIOLOGY | Facility: CLINIC | Age: 64
Discharge: HOME | End: 2024-12-30
Payer: COMMERCIAL

## 2024-12-30 ENCOUNTER — APPOINTMENT (OUTPATIENT)
Dept: RADIOLOGY | Facility: CLINIC | Age: 64
End: 2024-12-30
Payer: COMMERCIAL

## 2024-12-30 VITALS — BODY MASS INDEX: 34.14 KG/M2 | WEIGHT: 199.96 LBS | HEIGHT: 64 IN

## 2024-12-30 DIAGNOSIS — Z12.31 ENCOUNTER FOR SCREENING MAMMOGRAM FOR MALIGNANT NEOPLASM OF BREAST: ICD-10-CM

## 2024-12-30 DIAGNOSIS — F17.210 SMOKING GREATER THAN 30 PACK YEARS: ICD-10-CM

## 2024-12-30 PROCEDURE — 71271 CT THORAX LUNG CANCER SCR C-: CPT | Performed by: RADIOLOGY

## 2024-12-30 PROCEDURE — 77063 BREAST TOMOSYNTHESIS BI: CPT

## 2024-12-30 PROCEDURE — 71271 CT THORAX LUNG CANCER SCR C-: CPT

## 2024-12-30 PROCEDURE — 77067 SCR MAMMO BI INCL CAD: CPT | Performed by: RADIOLOGY

## 2024-12-30 PROCEDURE — 77063 BREAST TOMOSYNTHESIS BI: CPT | Performed by: RADIOLOGY

## 2025-01-06 ENCOUNTER — APPOINTMENT (OUTPATIENT)
Dept: PRIMARY CARE | Facility: CLINIC | Age: 65
End: 2025-01-06
Payer: COMMERCIAL

## 2025-01-06 ENCOUNTER — LAB (OUTPATIENT)
Dept: LAB | Facility: LAB | Age: 65
End: 2025-01-06
Payer: COMMERCIAL

## 2025-01-06 VITALS
WEIGHT: 206 LBS | DIASTOLIC BLOOD PRESSURE: 70 MMHG | SYSTOLIC BLOOD PRESSURE: 128 MMHG | TEMPERATURE: 97.5 F | HEART RATE: 98 BPM | RESPIRATION RATE: 16 BRPM | OXYGEN SATURATION: 97 % | BODY MASS INDEX: 35.34 KG/M2

## 2025-01-06 DIAGNOSIS — M25.562 ACUTE PAIN OF LEFT KNEE: Primary | ICD-10-CM

## 2025-01-06 DIAGNOSIS — Z00.00 ROUTINE GENERAL MEDICAL EXAMINATION AT A HEALTH CARE FACILITY: ICD-10-CM

## 2025-01-06 DIAGNOSIS — Z12.11 COLON CANCER SCREENING: ICD-10-CM

## 2025-01-06 DIAGNOSIS — Z00.00 HEALTHCARE MAINTENANCE: ICD-10-CM

## 2025-01-06 LAB
ALBUMIN SERPL BCP-MCNC: 3.8 G/DL (ref 3.4–5)
ALP SERPL-CCNC: 128 U/L (ref 33–136)
ALT SERPL W P-5'-P-CCNC: 11 U/L (ref 7–45)
ANION GAP SERPL CALC-SCNC: 10 MMOL/L (ref 10–20)
AST SERPL W P-5'-P-CCNC: 12 U/L (ref 9–39)
BASOPHILS # BLD AUTO: 0.09 X10*3/UL (ref 0–0.1)
BASOPHILS NFR BLD AUTO: 0.7 %
BILIRUB SERPL-MCNC: 0.4 MG/DL (ref 0–1.2)
BUN SERPL-MCNC: 7 MG/DL (ref 6–23)
CALCIUM SERPL-MCNC: 9.3 MG/DL (ref 8.6–10.6)
CHLORIDE SERPL-SCNC: 109 MMOL/L (ref 98–107)
CHOLEST SERPL-MCNC: 202 MG/DL (ref 0–199)
CHOLESTEROL/HDL RATIO: 3.8
CO2 SERPL-SCNC: 27 MMOL/L (ref 21–32)
CREAT SERPL-MCNC: 0.99 MG/DL (ref 0.5–1.05)
EGFRCR SERPLBLD CKD-EPI 2021: 64 ML/MIN/1.73M*2
EOSINOPHIL # BLD AUTO: 0.3 X10*3/UL (ref 0–0.7)
EOSINOPHIL NFR BLD AUTO: 2.3 %
ERYTHROCYTE [DISTWIDTH] IN BLOOD BY AUTOMATED COUNT: 13.4 % (ref 11.5–14.5)
GLUCOSE SERPL-MCNC: 95 MG/DL (ref 74–99)
HCT VFR BLD AUTO: 41.4 % (ref 36–46)
HDLC SERPL-MCNC: 52.9 MG/DL
HGB BLD-MCNC: 13.5 G/DL (ref 12–16)
IMM GRANULOCYTES # BLD AUTO: 0.03 X10*3/UL (ref 0–0.7)
IMM GRANULOCYTES NFR BLD AUTO: 0.2 % (ref 0–0.9)
LDLC SERPL CALC-MCNC: 123 MG/DL
LYMPHOCYTES # BLD AUTO: 2.89 X10*3/UL (ref 1.2–4.8)
LYMPHOCYTES NFR BLD AUTO: 22.5 %
MCH RBC QN AUTO: 29.1 PG (ref 26–34)
MCHC RBC AUTO-ENTMCNC: 32.6 G/DL (ref 32–36)
MCV RBC AUTO: 89 FL (ref 80–100)
MONOCYTES # BLD AUTO: 0.73 X10*3/UL (ref 0.1–1)
MONOCYTES NFR BLD AUTO: 5.7 %
NEUTROPHILS # BLD AUTO: 8.79 X10*3/UL (ref 1.2–7.7)
NEUTROPHILS NFR BLD AUTO: 68.6 %
NON HDL CHOLESTEROL: 149 MG/DL (ref 0–149)
NRBC BLD-RTO: 0 /100 WBCS (ref 0–0)
PLATELET # BLD AUTO: 388 X10*3/UL (ref 150–450)
POTASSIUM SERPL-SCNC: 3.8 MMOL/L (ref 3.5–5.3)
PROT SERPL-MCNC: 6.2 G/DL (ref 6.4–8.2)
RBC # BLD AUTO: 4.64 X10*6/UL (ref 4–5.2)
SODIUM SERPL-SCNC: 142 MMOL/L (ref 136–145)
TRIGL SERPL-MCNC: 131 MG/DL (ref 0–149)
TSH SERPL-ACNC: 2.15 MIU/L (ref 0.44–3.98)
VLDL: 26 MG/DL (ref 0–40)
WBC # BLD AUTO: 12.8 X10*3/UL (ref 4.4–11.3)

## 2025-01-06 PROCEDURE — 85025 COMPLETE CBC W/AUTO DIFF WBC: CPT

## 2025-01-06 PROCEDURE — 3078F DIAST BP <80 MM HG: CPT | Performed by: INTERNAL MEDICINE

## 2025-01-06 PROCEDURE — 84443 ASSAY THYROID STIM HORMONE: CPT

## 2025-01-06 PROCEDURE — 80053 COMPREHEN METABOLIC PANEL: CPT

## 2025-01-06 PROCEDURE — 3074F SYST BP LT 130 MM HG: CPT | Performed by: INTERNAL MEDICINE

## 2025-01-06 PROCEDURE — 80061 LIPID PANEL: CPT

## 2025-01-06 PROCEDURE — 99214 OFFICE O/P EST MOD 30 MIN: CPT | Performed by: INTERNAL MEDICINE

## 2025-01-06 RX ORDER — TRAZODONE HYDROCHLORIDE 100 MG/1
100 TABLET ORAL NIGHTLY
Qty: 90 TABLET | Refills: 3 | Status: SHIPPED | OUTPATIENT
Start: 2025-01-06

## 2025-01-06 ASSESSMENT — PATIENT HEALTH QUESTIONNAIRE - PHQ9
SUM OF ALL RESPONSES TO PHQ9 QUESTIONS 1 AND 2: 0
1. LITTLE INTEREST OR PLEASURE IN DOING THINGS: NOT AT ALL
2. FEELING DOWN, DEPRESSED OR HOPELESS: NOT AT ALL

## 2025-01-06 ASSESSMENT — ENCOUNTER SYMPTOMS
FREQUENCY: 0
DIARRHEA: 0
CONSTIPATION: 0
ABDOMINAL PAIN: 0
BLOOD IN STOOL: 0

## 2025-01-06 NOTE — PROGRESS NOTES
Patient here for a 3 month follow up    Subjective   Patient ID: Esmer Upton is a 64 y.o. female who presents for Follow-up.  The patient mentions ongoing left knee pain, and believes that she will require surgical repair.  She has established with  from Orthopedic Surgery, as she recalls good results with hip surgery in the past.  The patient also continues to follow with Dr.Budinsky from Orthopedic Surgery.  She continues to take alendronate since 3/2023 for a history of osteroporosis, and follows with  from Rheumatology.    The patient will be due for pap screening in 2026.  She recalls undergoing endometrial ablation in her early 40s to the best of her knowledge.  The patient denies any history of hysterectomy.    The patient continues to take trazodone 100mg once nightly, and finds that this is working well for sleep disturbance related to working night shifts.    The patient denies any hearing impairment or vision changes, and completes regular eye exams.  She also denies any abdominal pain, hematochezia, melena, bowel problems, or significant urinary symptoms.       Review of Systems   HENT:  Negative for hearing loss.    Eyes:  Negative for visual disturbance.   Gastrointestinal:  Negative for abdominal pain, blood in stool, constipation and diarrhea.   Genitourinary:  Negative for frequency.   Musculoskeletal:         Positive for left knee pain.       Objective   Physical Exam  Constitutional:       Appearance: Normal appearance.   Neck:      Vascular: No carotid bruit.   Cardiovascular:      Rate and Rhythm: Normal rate and regular rhythm.      Heart sounds: Normal heart sounds.   Pulmonary:      Effort: Pulmonary effort is normal.      Breath sounds: Normal breath sounds.   Abdominal:      General: Bowel sounds are normal.      Palpations: Abdomen is soft.      Tenderness: There is no abdominal tenderness.   Skin:     General: Skin is warm and dry.   Neurological:      General: No  focal deficit present.      Mental Status: She is alert and oriented to person, place, and time. Mental status is at baseline.   Psychiatric:         Mood and Affect: Mood normal.         Behavior: Behavior normal.         Assessment/Plan   Problem List Items Addressed This Visit    None  Visit Diagnoses         Codes    Colon cancer screening    -  Primary Z12.11    Relevant Orders    Colonoscopy Screening; Average Risk Patient    Routine general medical examination at a health care facility     Z00.00    Relevant Medications    traZODone (Desyrel) 100 mg tablet    Other Relevant Orders    Lipid panel    Comprehensive metabolic panel    CBC and Auto Differential    Tsh With Reflex To Free T4 If Abnormal            IMPRESSION/PLAN:     /70 in the office today.     Insomnia   - Symptoms ongoing.  Advised the patient to try Sleep 3 Nature's Bounty over the counter.  Maintained with trazodone 100mg at bedtime.      History of Smoking   - Last CT Chest 12/2022 showed few stable small bilateral noncalcified pulmonary nodules measuring up to 6 mm, likely benign.  12/2023 repeat CT showed Mid to upper lung zone predominant reticular and ground-glass changes appear grossly stable, potentially postinfectious/inflammatory sequelae, although could be in the spectrum of smoking-related lung disease.  CT Chest 12/2024 shows lung nodules stable.  Discussed possibly starting bupropion, and patient will consider for now.    Carpel Tunnel   - Stable. Continue wearing wrist splints to immobilize while sleeping. Call if symptoms worsen.      Bilateral Thoracic Back Pain  - Previously on tramadol 50 mg up to TID as needed.  Patient also okay to try applying lidocaine patches to the area and continue with pregabalin to 75 mg twice daily as below. Following with  from Pain Management at [facility].     Lumbar Radiculopathy  - Followed with Dr. Liang in Orthopedics, and Dr. Schneider in Pain Medicine.  Currently following  with  from [facility] Management at [facility].     Left Hip Arthritis   - s/p Total hip replacement on 3/21/2023 with  from Orthopedic Surgery.     Left Knee Pain  - Patient planning for surgery with  from Orthopedic Surgery.  Symptoms reproduced with extension.  Followed up with Dr.Budinsky from Orthopedic Surgery.  MR Knee showed Focal marrow edema of the distal femoral metaphysis with surrounding periosteal reaction likely representing insufficiency fracture.       Right foot and Bilateral Hip pain   - Increase pregabalin 75 mg BID Following with  in Orthopedic Surgery.     Osteoporosis   - Continue with Alendronate 70 mg as 1 tablet every week 30 to 60 minutes before breakfast on an empty stomach. Do not lie down after taking medication.     Health Maintenance   - Routine labs ordered including CBC, CMP, and a lipid panel to be completed in the fasting state. Added TSH.  Last PAP 6/2021, no history of hysterectomy. Last Mammogram 12/2024. Last colonoscopy performed 12/2022, repeat due 12/2025.  Ordered repeat colonoscopy with  from Gastroenterology.     Follow up in 3 months, call sooner if needed.       Scribe Attestation  By signing my name below, I, Annetta Linda, [place]   attest that this documentation has been prepared under the direction and in the presence of Papo Duong DO.   Annetta Linda 01/06/25 11:07 AM

## 2025-01-11 DIAGNOSIS — D72.829 LEUKOCYTOSIS, UNSPECIFIED TYPE: Primary | ICD-10-CM

## 2025-01-15 ENCOUNTER — APPOINTMENT (OUTPATIENT)
Dept: ORTHOPEDIC SURGERY | Facility: CLINIC | Age: 65
End: 2025-01-15
Payer: COMMERCIAL

## 2025-01-15 ENCOUNTER — HOSPITAL ENCOUNTER (OUTPATIENT)
Dept: RADIOLOGY | Facility: CLINIC | Age: 65
Discharge: HOME | End: 2025-01-15
Payer: COMMERCIAL

## 2025-01-15 DIAGNOSIS — M17.12 OSTEOARTHRITIS OF LEFT KNEE, UNSPECIFIED OSTEOARTHRITIS TYPE: Primary | ICD-10-CM

## 2025-01-15 DIAGNOSIS — T14.8XXA AVULSION FRACTURE: ICD-10-CM

## 2025-01-15 DIAGNOSIS — M79.672 LEFT FOOT PAIN: ICD-10-CM

## 2025-01-15 PROCEDURE — 99214 OFFICE O/P EST MOD 30 MIN: CPT | Performed by: ORTHOPAEDIC SURGERY

## 2025-01-15 PROCEDURE — 73630 X-RAY EXAM OF FOOT: CPT | Mod: LEFT SIDE | Performed by: RADIOLOGY

## 2025-01-15 PROCEDURE — 73630 X-RAY EXAM OF FOOT: CPT | Mod: LT

## 2025-01-15 NOTE — PROGRESS NOTES
Returns for left knee and foot.  Had acute onset of pain in the left knee in September.  She is seeing a sports medicine doctor and was diagnosed with insufficiency fracture left knee.  She is using a brace at work.  She has been follow-up with MRI and x-rays.  He got gel injections approved which she is starting next week.  We gave them to her about a year and a half ago and they seem to work well.  She denies any injury to her knee when the pain started.  Pain with stair climbing especially.  More recently having pain around the lateral aspect of her left foot.  Again denies any falls or injury.    Exam: Left knee: Patellofemoral crepitus.  Full range of motion.  Stable ligament exam.  No apparent effusion.    Left foot: Tender fifth metatarsal base.  Pain with eversion.  No crepitus.    I personally reviewed the following radiographic exams: AP lateral left knee shows moderate knee arthrosis.  No patellofemoral view.  MRI shows severe patellofemoral arthrosis with moderate mild to moderate medial compartment arthrosis.  Insufficiency changes medial femoral condyle.  X-ray of left foot shows small avulsion fracture fifth metatarsal base compared to previous films.    Assessment: Left knee arthrosis with healing insufficiency fracture.  Left fifth metatarsal avulsion fracture.    Plan: Discussed nonoperative and operative options in detail.   Risk and benefits discussed in detail. All questions answered today.  Recovery timeline and expectations discussed in detail.  Will see what her responses to the gel injections.  At some point she will need knee replacement though she seemed to respond well to the gel injections that we gave her in June 2023.  Supportive shoe wear for the left foot.  Her bone density from last year shows osteopenia.  Will follow-up as needed.

## 2025-01-22 ENCOUNTER — HOSPITAL ENCOUNTER (OUTPATIENT)
Dept: RADIOLOGY | Facility: EXTERNAL LOCATION | Age: 65
Discharge: HOME | End: 2025-01-22

## 2025-01-22 ENCOUNTER — OFFICE VISIT (OUTPATIENT)
Dept: ORTHOPEDIC SURGERY | Facility: CLINIC | Age: 65
End: 2025-01-22
Payer: COMMERCIAL

## 2025-01-22 DIAGNOSIS — M17.12 OSTEOARTHRITIS OF LEFT KNEE, UNSPECIFIED OSTEOARTHRITIS TYPE: ICD-10-CM

## 2025-01-22 PROCEDURE — 20611 DRAIN/INJ JOINT/BURSA W/US: CPT | Mod: LT | Performed by: FAMILY MEDICINE

## 2025-01-22 PROCEDURE — 99211 OFF/OP EST MAY X REQ PHY/QHP: CPT | Performed by: FAMILY MEDICINE

## 2025-01-22 PROCEDURE — 2500000004 HC RX 250 GENERAL PHARMACY W/ HCPCS (ALT 636 FOR OP/ED): Mod: JZ | Performed by: FAMILY MEDICINE

## 2025-01-22 RX ADMIN — Medication 20 MG: at 09:09

## 2025-01-22 NOTE — PROGRESS NOTES
Patient ID: Esmer Upton is a 64 y.o. female.    L Inj/Asp: L knee on 1/22/2025 9:09 AM  Indications: pain and joint swelling  Details: 22 G needle, ultrasound-guided superolateral approach  Medications: 20 mg sodium hyaluronate 10 mg/mL(mw 2.4 -3.6 million)  Outcome: tolerated well, no immediate complications  Procedure, treatment alternatives, risks and benefits explained, specific risks discussed. Consent was given by the patient. Immediately prior to procedure a time out was called to verify the correct patient, procedure, equipment, support staff and site/side marked as required. Patient was prepped and draped in the usual sterile fashion.           Esmer returns here today for left knee gel injection.  She is here for the first of 3 Euflexxa shots.  She tolerated today's injection well we will see her back in 1 in 2 weeks for the second and third injections.    At the conclusion of the visit there were no further questions by the patient/family regarding their plan of care.  Patient was instructed to call or return with any issues, questions, or concerns regarding their injury and/or treatment plan described above.    This note was prepared using voice recognition software.  The details of this note are correct and have been reviewed, and corrected to the best of my ability.  Some grammatical errors may persist related to the Dragon software     Cole C. Budinsky, MD  Office: [phone]

## 2025-01-23 DIAGNOSIS — Z96.642 STATUS POST LEFT HIP REPLACEMENT: ICD-10-CM

## 2025-01-23 RX ORDER — AMOXICILLIN 500 MG/1
2000 TABLET, FILM COATED ORAL ONCE
Qty: 4 TABLET | Refills: 0 | Status: SHIPPED | OUTPATIENT
Start: 2025-01-23 | End: 2025-01-23

## 2025-01-29 ENCOUNTER — OFFICE VISIT (OUTPATIENT)
Dept: ORTHOPEDIC SURGERY | Facility: CLINIC | Age: 65
End: 2025-01-29
Payer: COMMERCIAL

## 2025-01-29 ENCOUNTER — HOSPITAL ENCOUNTER (OUTPATIENT)
Dept: RADIOLOGY | Facility: EXTERNAL LOCATION | Age: 65
Discharge: HOME | End: 2025-01-29

## 2025-01-29 DIAGNOSIS — M17.12 OSTEOARTHRITIS OF LEFT KNEE, UNSPECIFIED OSTEOARTHRITIS TYPE: ICD-10-CM

## 2025-01-29 PROCEDURE — 99211 OFF/OP EST MAY X REQ PHY/QHP: CPT | Performed by: FAMILY MEDICINE

## 2025-01-29 PROCEDURE — 2500000004 HC RX 250 GENERAL PHARMACY W/ HCPCS (ALT 636 FOR OP/ED): Mod: JZ | Performed by: FAMILY MEDICINE

## 2025-01-29 PROCEDURE — 20611 DRAIN/INJ JOINT/BURSA W/US: CPT | Mod: LT | Performed by: FAMILY MEDICINE

## 2025-01-29 RX ADMIN — Medication 20 MG: at 09:10

## 2025-01-29 NOTE — PROGRESS NOTES
Patient ID: Esmer Upton is a 64 y.o. female.    L Inj/Asp: L knee on 1/29/2025 9:10 AM  Indications: pain and joint swelling  Details: 22 G needle, ultrasound-guided superolateral approach  Medications: 20 mg sodium hyaluronate 10 mg/mL(mw 2.4 -3.6 million)  Outcome: tolerated well, no immediate complications  Procedure, treatment alternatives, risks and benefits explained, specific risks discussed. Consent was given by the patient. Immediately prior to procedure a time out was called to verify the correct patient, procedure, equipment, support staff and site/side marked as required. Patient was prepped and draped in the usual sterile fashion.       Patient returns here today for the second of 3 Euflexxa injections.  She tolerated today's injection well.  She had no issues with the previous injection.  She has scheduled follow-up in 1 week for third and final injection.      01/29/25 at 9:14 AM - Cole C Budinsky, MD

## 2025-02-04 ENCOUNTER — APPOINTMENT (OUTPATIENT)
Dept: ORTHOPEDIC SURGERY | Facility: CLINIC | Age: 65
End: 2025-02-04
Payer: COMMERCIAL

## 2025-02-06 LAB
BASOPHILS # BLD AUTO: 85 CELLS/UL (ref 0–200)
BASOPHILS NFR BLD AUTO: 0.6 %
EOSINOPHIL # BLD AUTO: 185 CELLS/UL (ref 15–500)
EOSINOPHIL NFR BLD AUTO: 1.3 %
ERYTHROCYTE [DISTWIDTH] IN BLOOD BY AUTOMATED COUNT: 12.8 % (ref 11–15)
HCT VFR BLD AUTO: 44.6 % (ref 35–45)
HGB BLD-MCNC: 14.3 G/DL (ref 11.7–15.5)
LYMPHOCYTES # BLD AUTO: 2883 CELLS/UL (ref 850–3900)
LYMPHOCYTES NFR BLD AUTO: 20.3 %
MCH RBC QN AUTO: 28.7 PG (ref 27–33)
MCHC RBC AUTO-ENTMCNC: 32.1 G/DL (ref 32–36)
MCV RBC AUTO: 89.4 FL (ref 80–100)
MONOCYTES # BLD AUTO: 738 CELLS/UL (ref 200–950)
MONOCYTES NFR BLD AUTO: 5.2 %
NEUTROPHILS # BLD AUTO: ABNORMAL CELLS/UL (ref 1500–7800)
NEUTROPHILS NFR BLD AUTO: 72.6 %
PLATELET # BLD AUTO: 410 THOUSAND/UL (ref 140–400)
PMV BLD REES-ECKER: 9.5 FL (ref 7.5–12.5)
RBC # BLD AUTO: 4.99 MILLION/UL (ref 3.8–5.1)
WBC # BLD AUTO: 14.2 THOUSAND/UL (ref 3.8–10.8)

## 2025-02-07 ENCOUNTER — OFFICE VISIT (OUTPATIENT)
Dept: ORTHOPEDIC SURGERY | Facility: CLINIC | Age: 65
End: 2025-02-07
Payer: COMMERCIAL

## 2025-02-07 ENCOUNTER — HOSPITAL ENCOUNTER (OUTPATIENT)
Dept: RADIOLOGY | Facility: EXTERNAL LOCATION | Age: 65
Discharge: HOME | End: 2025-02-07

## 2025-02-07 DIAGNOSIS — M17.12 OSTEOARTHRITIS OF LEFT KNEE, UNSPECIFIED OSTEOARTHRITIS TYPE: ICD-10-CM

## 2025-02-07 PROCEDURE — 20611 DRAIN/INJ JOINT/BURSA W/US: CPT | Mod: LT | Performed by: FAMILY MEDICINE

## 2025-02-07 PROCEDURE — 99211 OFF/OP EST MAY X REQ PHY/QHP: CPT | Performed by: FAMILY MEDICINE

## 2025-02-07 PROCEDURE — 2500000004 HC RX 250 GENERAL PHARMACY W/ HCPCS (ALT 636 FOR OP/ED): Mod: JZ | Performed by: FAMILY MEDICINE

## 2025-02-07 RX ADMIN — Medication 20 MG: at 09:15

## 2025-02-07 NOTE — PROGRESS NOTES
Patient ID: Esmer Upton is a 64 y.o. female.    L Inj/Asp: L knee on 2/7/2025 9:15 AM  Indications: pain and joint swelling  Details: 22 G needle, ultrasound-guided superolateral approach  Medications: 20 mg sodium hyaluronate 10 mg/mL(mw 2.4 -3.6 million)  Outcome: tolerated well, no immediate complications  Procedure, treatment alternatives, risks and benefits explained, specific risks discussed. Consent was given by the patient. Immediately prior to procedure a time out was called to verify the correct patient, procedure, equipment, support staff and site/side marked as required. Patient was prepped and draped in the usual sterile fashion.       Patient received and tolerated today's injection well.  Will plan to have her follow-up with her surgeon Dr. Jb Chino and going forward in 6 to 8 weeks.  Recommended she try her best to see if she is able to get the off  brace to be worn more often certainly would be helpful while at work if she is able to do so.  She should call return with any worsening issues or concerns otherwise she may follow-up with Dr. Jb Chino.    At the conclusion of the visit there were no further questions by the patient/family regarding their plan of care.  Patient was instructed to call or return with any issues, questions, or concerns regarding their injury and/or treatment plan described above.    This note was prepared using voice recognition software.  The details of this note are correct and have been reviewed, and corrected to the best of my ability.  Some grammatical errors may persist related to the Dragon software     Cole C. Budinsky, MD  Office: [phone]

## 2025-02-10 DIAGNOSIS — D72.829 LEUKOCYTOSIS, UNSPECIFIED TYPE: Primary | ICD-10-CM

## 2025-02-12 ENCOUNTER — TELEPHONE (OUTPATIENT)
Dept: PRIMARY CARE | Facility: CLINIC | Age: 65
End: 2025-02-12
Payer: COMMERCIAL

## 2025-02-12 NOTE — TELEPHONE ENCOUNTER
Pt called to get message to Dr. Duong assistant:    She stated Dr. Duong was referring to hematologist got a text to schedule it. She called the soonest she could get in was 6/17/25, just wanted to make sure that was ok?    Please call pt at  [phone]

## 2025-02-25 DIAGNOSIS — M54.16 LUMBAR RADICULOPATHY: ICD-10-CM

## 2025-02-25 RX ORDER — PREGABALIN 75 MG/1
75 CAPSULE ORAL 2 TIMES DAILY
Qty: 60 CAPSULE | Refills: 3 | Status: SHIPPED | OUTPATIENT
Start: 2025-02-25

## 2025-03-03 ENCOUNTER — APPOINTMENT (OUTPATIENT)
Dept: PRIMARY CARE | Facility: CLINIC | Age: 65
End: 2025-03-03
Payer: COMMERCIAL

## 2025-03-03 VITALS
WEIGHT: 203 LBS | SYSTOLIC BLOOD PRESSURE: 128 MMHG | DIASTOLIC BLOOD PRESSURE: 70 MMHG | OXYGEN SATURATION: 99 % | TEMPERATURE: 97.9 F | HEART RATE: 98 BPM | RESPIRATION RATE: 16 BRPM | BODY MASS INDEX: 34.83 KG/M2

## 2025-03-03 DIAGNOSIS — M15.9 OSTEOARTHRITIS OF MULTIPLE JOINTS, UNSPECIFIED OSTEOARTHRITIS TYPE: Primary | ICD-10-CM

## 2025-03-03 DIAGNOSIS — Z00.00 HEALTHCARE MAINTENANCE: ICD-10-CM

## 2025-03-03 PROCEDURE — 3074F SYST BP LT 130 MM HG: CPT | Performed by: INTERNAL MEDICINE

## 2025-03-03 PROCEDURE — 99213 OFFICE O/P EST LOW 20 MIN: CPT | Performed by: INTERNAL MEDICINE

## 2025-03-03 PROCEDURE — 3078F DIAST BP <80 MM HG: CPT | Performed by: INTERNAL MEDICINE

## 2025-03-03 ASSESSMENT — ENCOUNTER SYMPTOMS: SLEEP DISTURBANCE: 1

## 2025-03-03 NOTE — PROGRESS NOTES
Patient here for a referral for a OBGYN- bump vaginal area    Subjective   Patient ID: Esmer Upton is a 64 y.o. female who presents for Follow-up.    The patient mentions ongoing pain in the left foot.  An Xray of the extremity in 1/2025 showed small 5th metatarsal base avulsion fracture, and she suspects worsening of the injury.  The patient has been taking alendronate 70mg once weekly on Mondays with good compliance for quite some time.  She is scheduled for an upcoming appointment with Orthopedics.    The patient reports left knee pain which is ongoing.  She recently received vicosupplementation on 2/7/2025.  The patient is following with Dr.Budinsky from Sports Medicine.  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    The patient has been taking pregabalin 75mg once or twice daily for chronic pain.  She finds that this is helping, but believes that taking the medication twice daily more consistently may help.    The patient mentions a small mass in the vaginal area of recent onset.  She inquires about a referral to Obstetrics/Gynecology.    The patient reports ongoing sleep disturbance, and has purchased Nature's Bounty Sleep 3 Melatonin.  She asks whether it would be recommended she start this medication.        Review of Systems   Genitourinary:         Positive for small vaginal mass.   Musculoskeletal:         Positive for left foot pain and left knee pain.   Psychiatric/Behavioral:  Positive for sleep disturbance.      Objective   Physical Exam  Constitutional:       Appearance: Normal appearance.   Neck:      Vascular: No carotid bruit.   Cardiovascular:      Rate and Rhythm: Normal rate and regular rhythm.      Heart sounds: Normal heart sounds.   Pulmonary:      Effort: Pulmonary effort is normal.      Breath sounds: Normal breath sounds.   Abdominal:      General: Bowel sounds are normal.      Palpations:  Abdomen is soft.      Tenderness: There is no abdominal tenderness.   Skin:     General: Skin is warm and dry.   Neurological:      General: No focal deficit present.      Mental Status: She is alert and oriented to person, place, and time. Mental status is at baseline.   Psychiatric:         Mood and Affect: Mood normal.         Behavior: Behavior normal.       Assessment/Plan   Problem List Items Addressed This Visit    None  Visit Diagnoses         Codes    Healthcare maintenance    -  Primary Z00.00    Relevant Orders    Referral to Gynecology            IMPRESSION/PLAN:     Right foot and Bilateral Hip pain   - Recommended patient take pregabalin 75 mg BID consistently and see if there is benefit. Following with  in Orthopedic Surgery.  Call the clinic with any concerns.    /70 in the office today.     Insomnia   - Symptoms ongoing.  Advised the patient to try Sleep 3 Nature's Bounty over the counter.  Maintained with trazodone 100mg at bedtime.      History of Smoking   - Last CT Chest 12/2022 showed few stable small bilateral noncalcified pulmonary nodules measuring up to 6 mm, likely benign.  12/2023 repeat CT showed Mid to upper lung zone predominant reticular and ground-glass changes appear grossly stable, potentially postinfectious/inflammatory sequelae, although could be in the spectrum of smoking-related lung disease.  CT Chest 12/2024 shows lung nodules stable.  Discussed possibly starting bupropion, and patient will consider for now.     Carpel Tunnel   - Stable. Continue wearing wrist splints to immobilize while sleeping. Call if symptoms worsen.      Bilateral Thoracic Back Pain  - Previously on tramadol 50 mg up to TID as needed.  Patient also okay to try applying lidocaine patches to the area and continue with pregabalin to 75 mg twice daily as below. Following with  from Pain Management at [facility].     Lumbar Radiculopathy  - Followed with Dr. Liang in Orthopedics, and  Dr. Schneider in Pain Medicine.  Currently following with  from Pain Management at [facility].     Left Hip Arthritis   - s/p Total hip replacement on 3/21/2023 with  from Orthopedic Surgery.     Left Knee Pain  - Patient planning for surgery with  from Orthopedic Surgery.  Symptoms reproduced with extension.  Followed up with Dr.Budinsky from Orthopedic Surgery.  MR Knee showed Focal marrow edema of the distal femoral metaphysis with surrounding periosteal reaction likely representing insufficiency fracture.        Osteoporosis   - Continue with Alendronate 70 mg as 1 tablet every week 30 to 60 minutes before breakfast on an empty stomach. Do not lie down after taking medication.     Health Maintenance   - Routine labs 1/2025.  Last PAP 6/2021, no history of hysterectomy. Last Mammogram 12/2024. Last colonoscopy performed 12/2022, repeat due 12/2025.  Previously ordered repeat colonoscopy with  from Gastroenterology.  Ordered referral to Obstetrics/Gynecology with .     Follow up in 3 months, call sooner if needed.       Scribe Attestation  By signing my name below, I, Annetta Linda, Poornima   attest that this documentation has been prepared under the direction and in the presence of Papo Duong DO.   Annetta Linda 03/03/25 3:00 PM

## 2025-03-19 DIAGNOSIS — M81.0 OSTEOPOROSIS, UNSPECIFIED OSTEOPOROSIS TYPE, UNSPECIFIED PATHOLOGICAL FRACTURE PRESENCE: ICD-10-CM

## 2025-03-19 RX ORDER — ALENDRONATE SODIUM 70 MG/1
70 TABLET ORAL
Qty: 12 TABLET | Refills: 0 | Status: SHIPPED | OUTPATIENT
Start: 2025-03-19 | End: 2025-06-17

## 2025-03-21 ENCOUNTER — OFFICE VISIT (OUTPATIENT)
Dept: HEMATOLOGY/ONCOLOGY | Facility: CLINIC | Age: 65
End: 2025-03-21
Payer: COMMERCIAL

## 2025-03-21 VITALS
DIASTOLIC BLOOD PRESSURE: 66 MMHG | BODY MASS INDEX: 35.18 KG/M2 | TEMPERATURE: 97.2 F | HEART RATE: 98 BPM | WEIGHT: 205.03 LBS | OXYGEN SATURATION: 96 % | RESPIRATION RATE: 20 BRPM | SYSTOLIC BLOOD PRESSURE: 125 MMHG

## 2025-03-21 DIAGNOSIS — D72.829 LEUKOCYTOSIS, UNSPECIFIED TYPE: Primary | ICD-10-CM

## 2025-03-21 PROCEDURE — 99215 OFFICE O/P EST HI 40 MIN: CPT | Performed by: INTERNAL MEDICINE

## 2025-03-21 PROCEDURE — 3074F SYST BP LT 130 MM HG: CPT | Performed by: INTERNAL MEDICINE

## 2025-03-21 PROCEDURE — 3078F DIAST BP <80 MM HG: CPT | Performed by: INTERNAL MEDICINE

## 2025-03-21 PROCEDURE — 99205 OFFICE O/P NEW HI 60 MIN: CPT | Performed by: INTERNAL MEDICINE

## 2025-03-21 ASSESSMENT — COLUMBIA-SUICIDE SEVERITY RATING SCALE - C-SSRS
1. IN THE PAST MONTH, HAVE YOU WISHED YOU WERE DEAD OR WISHED YOU COULD GO TO SLEEP AND NOT WAKE UP?: NO
2. HAVE YOU ACTUALLY HAD ANY THOUGHTS OF KILLING YOURSELF?: NO
6. HAVE YOU EVER DONE ANYTHING, STARTED TO DO ANYTHING, OR PREPARED TO DO ANYTHING TO END YOUR LIFE?: NO

## 2025-03-21 ASSESSMENT — ENCOUNTER SYMPTOMS
OCCASIONAL FEELINGS OF UNSTEADINESS: 0
LOSS OF SENSATION IN FEET: 0
DEPRESSION: 0

## 2025-03-21 ASSESSMENT — PAIN SCALES - GENERAL: PAINLEVEL_OUTOF10: 0-NO PAIN

## 2025-03-21 NOTE — PROGRESS NOTES
Patient ID: : Esmer Upton            Primary Care Provider: Papo Duong DO    LOCATION:  [facility]    REFERRING PHYSICIAN:  Papo Duong DO    HEMATOLOGY ONCOLOGY PROBLEMS:  Chronic leukocytosis.    CHIEF COMPLAINTS:  Patient is in the clinic today for evaluation of leukocytosis.    HISTORY:  Esmer Upton is a 64 y.o. female with leukocytosis.  CBC from 2025 showed a white cell count of 14.2 with hemoglobin of 14.3 and platelets of 410.  White cell differential mainly showed neutrophilia.  Metabolic profile was unremarkable in 2025.  Review of the records in the EMR indicate chronic leukocytosis dating back to 2021.  WC was 11.4 at that time.  Clinically she has issues with significant DJD/osteoarthritis.  There is also history of chronic smoking.    INTERVAL HISTORY:  Patient denies any new complaints other than issues with arthritic discomfort. No history of nausea, vomiting, fever, night sweats, diarrhea, rash, anorexia or weight loss. No recent changes in medications.    PAST MEDICAL HISTORY:  1.  Degenerative joint disease/osteoarthritis lumbar radiculopathy.  2.  Osteoporosis/vitamin D deficiency  3.  Chronic neuropathy.  4.  Anxiety  5.  History of cholecystectomy, endometrial ablation, left hip replacement, left knee surgeries.    SOCIAL HISTORY:  She is  and lives alone in [place].  Half pack a day for 43 years smoking history.  Nonalcoholic.  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  Born and raised in [place].    FAMILY HISTORY:  Father  at age 78 from lung cancer.  Mother age 94 is a nursing home resident.  1 sister, 2 children, 7 grandkids and 1 great grandkids ~ all alive and well.  No other family history of bleeding, clotting or malignant disorders in the family history.    REVIEW OF SYSTEMS:   Pertinent finding as per the history above.  All other systems have been reviewed and  generally negative and noncontributory.    VITAL SIGNS  BSA: 2.05 meters squared  /66   Pulse 98   Temp 36.2 °C (97.2 °F)   Resp 20   Wt 93 kg (205 lb 0.4 oz)   SpO2 96%   BMI 35.18 kg/m²     PHYSICAL EXAMINATION:  Detailed physical examination not done.    LAB DATA:  Patient recent and previous blood work results were reviewed and have been detailed in the history above.    ASSESSMENT / PLAN:  1.  Chronic leukocytosis.  Most likely reactive secondary to baseline osteoarthritis and related inflammation.  Please refer the details as outlined above.  In summary patient has been noted to have chronic leukocytosis in the differential mainly showing increased neutrophils with normal hemoglobin and platelets.  Latest blood work from February 2025 showed a white cell count of 14.2 with increased neutrophils.  Clinically her main problem is of arthritic discomfort but no other localizing sign and symptom.  No history of any ongoing infection, medication changes etc.    Long discussion with the patient and she is reassured that she has reactive leukocytosis mainly secondary to baseline degenerative joint disease.  Possibility of primary clonal disorder is less likely.  There is no immediate need for additional workup.  For now we will monitor closely.  In the future if there is any concern regarding worsening blood work then we will do further evaluation including possibility of bone marrow biopsy.    2.  Follow-up.  She will return to the clinic in 6 months    I would like to thank Dr. Winslow for providing us the opportunity to participate in Ms. Upton's medical care    A total of 60 minutes were spent in evaluation - performing physical examination, history taking, review of the previous extensive records/information and formulating current and future management plan. Majority of the time was spend in consultation and discussion.    This note has been transcribed using Dragon voice recognition system and  there is a possibility of unintentional typing misprints.

## 2025-03-26 ENCOUNTER — OFFICE VISIT (OUTPATIENT)
Dept: ORTHOPEDIC SURGERY | Facility: CLINIC | Age: 65
End: 2025-03-26
Payer: COMMERCIAL

## 2025-03-26 DIAGNOSIS — M17.12 OSTEOARTHRITIS OF LEFT KNEE, UNSPECIFIED OSTEOARTHRITIS TYPE: Primary | ICD-10-CM

## 2025-03-26 PROCEDURE — 99213 OFFICE O/P EST LOW 20 MIN: CPT | Performed by: ORTHOPAEDIC SURGERY

## 2025-03-26 NOTE — PROGRESS NOTES
"Returns for left knee.  Has been getting gel injections from Dr. Budinsky.  Last 1 was in February.  The more recent \"numbness\" and pain along the medial aspect the knee rating down her shin.  Gets some pain up in her thigh.  Does have an unloading brace but she is not allowed to wear that to work.  She also has a simpler compression sleeve brace that she wears to work.  No history of back issue.    Exam: Crepitus with active range of motion left knee.  Tender along medial joint line.  Tender along patellofemoral joint line.  No erythema.  Normal sensation along medial knee and thigh.    Assessment: Left knee arthrosis.    Plan: Discussed nonoperative and operative options in detail.   Risk and benefits discussed in detail. All questions answered today.  Recovery timeline and expectations discussed in detail.  Discussed possible irritation of the quad muscle and nerves from the bulky  brace.  May try a few weeks without that brace to see if resolves some of these thigh and nerve issues.  Discussed possible knee replacement in the future.  "

## 2025-04-03 ENCOUNTER — APPOINTMENT (OUTPATIENT)
Dept: HEMATOLOGY/ONCOLOGY | Facility: CLINIC | Age: 65
End: 2025-04-03
Payer: COMMERCIAL

## 2025-04-14 ENCOUNTER — HOSPITAL ENCOUNTER (OUTPATIENT)
Dept: RADIOLOGY | Facility: CLINIC | Age: 65
Discharge: HOME | End: 2025-04-14
Payer: COMMERCIAL

## 2025-04-14 ENCOUNTER — APPOINTMENT (OUTPATIENT)
Dept: PRIMARY CARE | Facility: CLINIC | Age: 65
End: 2025-04-14
Payer: COMMERCIAL

## 2025-04-14 ENCOUNTER — TELEPHONE (OUTPATIENT)
Dept: PRIMARY CARE | Facility: CLINIC | Age: 65
End: 2025-04-14

## 2025-04-14 VITALS
OXYGEN SATURATION: 100 % | TEMPERATURE: 97.6 F | HEART RATE: 90 BPM | WEIGHT: 203 LBS | BODY MASS INDEX: 34.83 KG/M2 | RESPIRATION RATE: 16 BRPM | DIASTOLIC BLOOD PRESSURE: 70 MMHG | SYSTOLIC BLOOD PRESSURE: 130 MMHG

## 2025-04-14 DIAGNOSIS — M54.6 ACUTE MIDLINE THORACIC BACK PAIN: ICD-10-CM

## 2025-04-14 DIAGNOSIS — M54.6 ACUTE MIDLINE THORACIC BACK PAIN: Primary | ICD-10-CM

## 2025-04-14 DIAGNOSIS — G56.00 CARPAL TUNNEL SYNDROME, UNSPECIFIED LATERALITY: ICD-10-CM

## 2025-04-14 PROCEDURE — 3075F SYST BP GE 130 - 139MM HG: CPT | Performed by: INTERNAL MEDICINE

## 2025-04-14 PROCEDURE — 99214 OFFICE O/P EST MOD 30 MIN: CPT | Performed by: INTERNAL MEDICINE

## 2025-04-14 PROCEDURE — 3078F DIAST BP <80 MM HG: CPT | Performed by: INTERNAL MEDICINE

## 2025-04-14 PROCEDURE — 72072 X-RAY EXAM THORAC SPINE 3VWS: CPT

## 2025-04-14 RX ORDER — METHOCARBAMOL 500 MG/1
TABLET, FILM COATED ORAL
Qty: 40 TABLET | Refills: 0 | Status: SHIPPED | OUTPATIENT
Start: 2025-04-14

## 2025-04-14 RX ORDER — CYCLOBENZAPRINE HCL 10 MG
10 TABLET ORAL NIGHTLY PRN
Qty: 30 TABLET | Refills: 0 | Status: SHIPPED | OUTPATIENT
Start: 2025-04-14 | End: 2025-04-14 | Stop reason: ALTCHOICE

## 2025-04-14 RX ORDER — PREDNISONE 5 MG/1
TABLET ORAL
Qty: 30 TABLET | Refills: 0 | Status: SHIPPED | OUTPATIENT
Start: 2025-04-14

## 2025-04-14 ASSESSMENT — ENCOUNTER SYMPTOMS
BACK PAIN: 1
NUMBNESS: 1

## 2025-04-14 NOTE — TELEPHONE ENCOUNTER
Pt was in today for appointment - Dr Duong sent in Flexeril for  - however pt states she already takes that, her dentist prescribes and that it doesn't help. - Please advise if something different can be sent in

## 2025-04-14 NOTE — PROGRESS NOTES
Patient here for a 3 month follow up    Subjective   Patient ID: Esmer Upton is a 64 y.o. female who presents for Follow-up.    The patient prefers not to undergo surgery for left knee arthrosis.  She notes recent acute pain after discontinuing wearing a knee brace, and does not recall any recent injury or trauma to the area.  The patient continues to follow with  from Orthopedic Surgery.    The patient notes ongoing shoulder pain as well as occasional hand numbness.  She is scheduled for a follow-up with Rheumatology in 8/2025.      The patient mentions intermittent back pain which seems to be exacerbated by specific movements. She continues to take pregabalin 75mg twice daily, and finds that this is helping.    The patient reports bilateral foot pain.     The patient has met with Oncology for a history of leukocytosis, and states that the condition is stable.  She is scheduled for follow-up in 2026.    The patient has scheduled an appointment with Obstetrics/Gynecology for routine follow-up on 5/7/2025.      Review of Systems   Musculoskeletal:  Positive for back pain.        Positive for shoulder pain, left knee pain, and bilateral foot pain.   Neurological:  Positive for numbness.       Objective   Physical Exam  Constitutional:       Appearance: Normal appearance.   Neck:      Vascular: No carotid bruit.   Cardiovascular:      Rate and Rhythm: Normal rate and regular rhythm.      Heart sounds: Normal heart sounds.   Pulmonary:      Effort: Pulmonary effort is normal.      Breath sounds: Normal breath sounds.   Abdominal:      General: Bowel sounds are normal.      Palpations: Abdomen is soft.      Tenderness: There is no abdominal tenderness.   Skin:     General: Skin is warm and dry.   Neurological:      General: No focal deficit present.      Mental Status: She is alert and oriented to person, place, and time. Mental status is at baseline.   Psychiatric:         Mood and Affect: Mood normal.          Behavior: Behavior normal.       Assessment/Plan   Problem List Items Addressed This Visit             ICD-10-CM    Back pain - Primary M54.9    Relevant Medications    predniSONE (Deltasone) 5 mg tablet    cyclobenzaprine (Flexeril) 10 mg tablet    Other Relevant Orders    XR thoracic spine 3 views         IMPRESSION/PLAN:     Acute Midline Thoracic Back Pain  - Bilateral spasm in paraspinals in thoracic area.  Ordered Xray Thoracic Spine.  Prescribed cyclobenzaprine 10mg once nightly, and prednisone 5mg to be taken as directed on packaging.  Discussed adverse effect profile of both medications.  Call the clinic if symptoms persist or worsen.    /70 in the office today.     Insomnia   - Symptoms ongoing.  Advised the patient to try Sleep 3 Nature's Bounty over the counter.  Maintained with trazodone 100mg at bedtime.      History of Smoking   - Last CT Chest 12/2022 showed few stable small bilateral noncalcified pulmonary nodules measuring up to 6 mm, likely benign.  12/2023 repeat CT showed Mid to upper lung zone predominant reticular and ground-glass changes appear grossly stable, potentially postinfectious/inflammatory sequelae, although could be in the spectrum of smoking-related lung disease.  CT Chest 12/2024 shows lung nodules stable.  Discussed possibly starting bupropion, and patient will consider for now.     Woman's Wellness  - Patient scheduled to establish care with  from Obstetrics/Gynecology on 5/7/2025.    Carpel Tunnel   - Stable. Continue wearing wrist splints to immobilize while sleeping. Call if symptoms worsen.      Bilateral Thoracic Back Pain  - Previously on tramadol 50 mg up to TID as needed.  Patient also okay to try applying lidocaine patches to the area and continue with pregabalin to 75 mg twice daily as below. Following with  from Pain Management at [facility].     Lumbar Radiculopathy  - Followed with Dr. Liang in Orthopedics, and Dr. Schneider in Pain  Medicine.  Currently following with  from Pain Management at [facility].     Left Hip Arthritis   - s/p Total hip replacement on 3/21/2023 with  from Orthopedic Surgery.     Left Knee Pain  - Patient considering surgery with  from Orthopedic Surgery.  Symptoms reproduced with extension.  Followed up with Dr.Budinsky from Orthopedic Surgery.  MR Knee showed Focal marrow edema of the distal femoral metaphysis with surrounding periosteal reaction likely representing insufficiency fracture.        Right foot and Bilateral Hip pain   - Recommended patient take pregabalin 75 mg BID consistently and see if there is benefit. Following with  in Orthopedic Surgery.  Call the clinic with any concerns.    Osteoporosis   - Continue with Alendronate 70 mg as 1 tablet every week 30 to 60 minutes before breakfast on an empty stomach. Do not lie down after taking medication.     Health Maintenance   - Routine labs 1/2025.  Last PAP 6/2021, no history of hysterectomy. Last Mammogram 12/2024. Last colonoscopy performed 12/2022, repeat due 12/2025.  Previously ordered repeat colonoscopy with  from Gastroenterology, and patient is trying to schedule this.     Follow up in 4 months, call sooner if needed.       Scribe Attestation  By signing my name below, I, Annetta Linda, Poornima   attest that this documentation has been prepared under the direction and in the presence of Papo Duong DO.   Annetta Linda 04/14/25 11:08 AM

## 2025-04-16 ENCOUNTER — APPOINTMENT (OUTPATIENT)
Dept: ORTHOPEDIC SURGERY | Facility: CLINIC | Age: 65
End: 2025-04-16
Payer: COMMERCIAL

## 2025-04-16 ENCOUNTER — HOSPITAL ENCOUNTER (OUTPATIENT)
Dept: RADIOLOGY | Facility: CLINIC | Age: 65
Discharge: HOME | End: 2025-04-16
Payer: COMMERCIAL

## 2025-04-16 DIAGNOSIS — M19.071 ARTHRITIS OF BOTH MIDFEET: Primary | ICD-10-CM

## 2025-04-16 DIAGNOSIS — M79.672 LEFT FOOT PAIN: ICD-10-CM

## 2025-04-16 DIAGNOSIS — M79.671 RIGHT FOOT PAIN: ICD-10-CM

## 2025-04-16 DIAGNOSIS — M19.072 ARTHRITIS OF BOTH MIDFEET: Primary | ICD-10-CM

## 2025-04-16 PROCEDURE — 99213 OFFICE O/P EST LOW 20 MIN: CPT | Performed by: ORTHOPAEDIC SURGERY

## 2025-04-16 PROCEDURE — 73630 X-RAY EXAM OF FOOT: CPT | Mod: 50

## 2025-04-16 NOTE — PROGRESS NOTES
Here for both feet.  Increased pain across the arch of both feet.  Occasionally gets sharp pain in the left great toe.  No injuries.  Had a prednisone taper ordered by her PCP which she has not started yet.  Has not been taking some Tylenol.  Occasional ice.    Exam: Tender across dorsal midfoot bilateral.  Pain with left first MTP motion.  No visible swelling.    I personally reviewed the following radiographic exams: Both feet shows multifocal midfoot arthritis.  No acute changes.  Left first MTP and second MTP arthritis.    Assessment: Bilateral midfoot arthritis.    Plan: Discussed nonoperative and operative options in detail.   Risk and benefits discussed in detail. All questions answered today.  Recovery timeline and expectations discussed in detail.  Discussed supportive shoe wear.  Discussed follow-up with rheumatology discussed other medications other than osteoporosis treatment.  Can go back to her ibuprofen after the prednisone if necessary.  Has tried Celebrex and Mobic in the past without relief.  Discussed possible midfoot fusion surgery.

## 2025-04-21 ENCOUNTER — TELEPHONE (OUTPATIENT)
Dept: PRIMARY CARE | Facility: CLINIC | Age: 65
End: 2025-04-21
Payer: COMMERCIAL

## 2025-04-21 DIAGNOSIS — M54.6 BILATERAL THORACIC BACK PAIN, UNSPECIFIED CHRONICITY: Primary | ICD-10-CM

## 2025-04-21 NOTE — TELEPHONE ENCOUNTER
Pt called in stating she saw Dr Duong and was given medication for her back pain. Has taken it since her visit and feels like it isn't helping today. Was more active today making breakfast for her family and on her feet. Pt would like to know if she could try something else? Please advise, she is ok to wait for GG/Tisha. Thank you!

## 2025-04-22 RX ORDER — PREDNISONE 5 MG/1
TABLET ORAL
Qty: 30 TABLET | Refills: 0 | Status: SHIPPED | OUTPATIENT
Start: 2025-04-22

## 2025-04-22 NOTE — TELEPHONE ENCOUNTER
I would recommend she reach back out to pain management at this point to help.  I can send her in a prednisone taper if she would like but I wouldn't try another muscle relaxant if they're not helping.

## 2025-05-02 ENCOUNTER — OFFICE VISIT (OUTPATIENT)
Facility: CLINIC | Age: 65
End: 2025-05-02
Payer: COMMERCIAL

## 2025-05-02 ENCOUNTER — HOSPITAL ENCOUNTER (OUTPATIENT)
Dept: RADIOLOGY | Facility: CLINIC | Age: 65
Discharge: HOME | End: 2025-05-02
Payer: COMMERCIAL

## 2025-05-02 VITALS
WEIGHT: 203 LBS | TEMPERATURE: 97.9 F | HEART RATE: 90 BPM | HEIGHT: 64 IN | BODY MASS INDEX: 34.66 KG/M2 | DIASTOLIC BLOOD PRESSURE: 92 MMHG | SYSTOLIC BLOOD PRESSURE: 140 MMHG | RESPIRATION RATE: 16 BRPM

## 2025-05-02 DIAGNOSIS — M54.42 CHRONIC MIDLINE LOW BACK PAIN WITH LEFT-SIDED SCIATICA: ICD-10-CM

## 2025-05-02 DIAGNOSIS — G89.29 CHRONIC MIDLINE THORACIC BACK PAIN: Primary | ICD-10-CM

## 2025-05-02 DIAGNOSIS — G89.29 CHRONIC MIDLINE LOW BACK PAIN WITH LEFT-SIDED SCIATICA: ICD-10-CM

## 2025-05-02 DIAGNOSIS — M54.6 CHRONIC MIDLINE THORACIC BACK PAIN: Primary | ICD-10-CM

## 2025-05-02 PROCEDURE — 72120 X-RAY BEND ONLY L-S SPINE: CPT

## 2025-05-02 PROCEDURE — 3080F DIAST BP >= 90 MM HG: CPT | Performed by: PHYSICIAN ASSISTANT

## 2025-05-02 PROCEDURE — 3077F SYST BP >= 140 MM HG: CPT | Performed by: PHYSICIAN ASSISTANT

## 2025-05-02 PROCEDURE — 3008F BODY MASS INDEX DOCD: CPT | Performed by: PHYSICIAN ASSISTANT

## 2025-05-02 PROCEDURE — 99214 OFFICE O/P EST MOD 30 MIN: CPT | Performed by: PHYSICIAN ASSISTANT

## 2025-05-02 ASSESSMENT — PAIN SCALES - GENERAL: PAINLEVEL_OUTOF10: 4

## 2025-05-02 NOTE — PROGRESS NOTES
[facility] [place]  Department of Neurological Surgery  Established Patient Visit    History of Present Illness  Esmer Upton is a 64 y.o. year old female who presents to the spine clinic in follow up with continued midline thoracic back pain focused at approximately T7.  Also she endorses 6 to 8-week onset of bilateral thoracic back pain just below this prior symptomatic area.  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  She denies any balance disturbance or bowel or bladder changes.  X-rays were negative for fracture or other acute findings.  She has progressed through steroid courses which do provide some improvement though her symptoms continue to return at the end of her workday.  CT lung screening also without fracture or subluxation.  Tylenol utilized for pain additionally she is recommended to avoid NSAIDs.  Additional use of Lyrica twice daily also without significant improvement.  Furthermore she describes area of low back pain with left left lower extremity numbness.        Patient's BMI is Body mass index is 34.84 kg/m².    14/14 systems reviewed and negative other than what is listed in the history of present illness    Problem List[1]  Medical History[2]  Surgical History[3]  Social History     Tobacco Use    Smoking status: Every Day     Types: Cigarettes    Smokeless tobacco: Never   Substance Use Topics    Alcohol use: Never     family history includes Arthritis in her mother; Diabetes in her mother; Lung cancer in her father; No Known Problems in her sister.  Current Medications[4]  Allergies[5]    Physical Examination:    General: Well developed, awake/alert/oriented x3, no distress, alert and cooperative  Skin: Warm and dry, no lesions, no rashes  ENMT: Mucous membranes moist, no apparent injury, no lesions seen  Head/Neck: Neck  Supple, no apparent injury  Respiratory/Thorax: Normal breath sounds with good chest expansion, thorax symmetric  Cardiovascular: No pitting edema, no JVD       Muscle Bulk: Normal and symmetric in all extremities    Posture:   -- Cervical: Normal  -- Thoracic: Normal  -- Lumbar : Normal  Paraspinal muscle spasm/tenderness absent.   Midline tenderness absent    Sensation: Mild deficit sensation left laterally through calf from just proximal of knee to ankle, otherwise intact to light touch       Results:  I personally reviewed and interpreted the imaging results which included CT lung screening with no fracture or severe osteophyte formation through thoracic spine.    Assessment and Plan:    Esmer Upton is a 64 y.o. year old female who presents to the spine clinic in follow up with continued midline thoracic back pain focused at approximately T7.  Also she endorses 6 to 8-week onset of bilateral thoracic back pain just below this prior symptomatic area.  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  She denies any balance disturbance or bowel or bladder changes.  X-rays were negative for fracture or other acute findings.  She has progressed through steroid courses which do provide some improvement though her symptoms continue to return at the end of her workday.  CT lung screening also without fracture or subluxation.  Tylenol utilized for pain additionally she is recommended to avoid NSAIDs.  Additional use of Lyrica twice daily also without significant improvement.  Furthermore she describes area of low back pain with left left lower extremity numbness.    Will need evaluation of soft tissue/neural involvement and MRI thoracic spine ordered for further follow-up.  Patient endorses elevated white blood cell count on multiple lab draws and will add contrast to  scan.      I have reviewed all prior documentation and reviewed the electronic medical record since admission. I have personally have reviewed all advanced imaging not just the reports and used my interpretation as documented as the relevant findings. I have reviewed the risks and benefits of all treatment recommendations listed in this note with the patient and family.       The above clinical summary has been dictated with voice recognition software. It has not been proofread for grammatical errors, typographical mistakes, or other semantic inconsistencies.    Thank you for visiting our office today. It was our pleasure to take part in your healthcare.     Do not hesitate to call with any questions regarding your plan of care after leaving at [phone] M-F 8am-4pm.     To clinicians, thank you very much for this kind referral. It is a privilege to partner with you in the care of your patients. My office would be delighted to assist you with any further consultations or with questions regarding the plan of care outlined. Do not hesitate to call the office or contact me directly.       Sincerely,      RAJESH Yu, PA-C  Associate Physician Assistant, Neurosurgery  Clinical   [facility] [place] [address]    Phone: [phone]  Fax: [phone]                   [1]   Patient Active Problem List  Diagnosis    Abnormal mammogram    Acute UTI    Anxiety    Arthritis of midfoot    Benign essential hypertension    Carpal tunnel syndrome    Chronic obstructive pulmonary disease (Multi)    Elevated alkaline phosphatase level    Elevated parathyroid hormone    Elevated white blood cell count    Fibrocystic breast disease (FCBD)    Insomnia    Internal derangement of left knee    Joint pain, knee    Knee sprain    Left knee pain    Lumbar radiculitis     Lumbar spondylosis    Myofascial pain syndrome    Osteoporosis    Plantar fasciitis, right    Arthropathy    Osteoarthritis of multiple joints    Osteoarthritis    Primary localized osteoarthritis of left knee    Primary osteoarthritis, unspecified ankle and foot    Primary localized osteoarthrosis of pelvic region    Right flank pain    Hip pain, right    Right foot pain    Sacral insufficiency fracture    Sacroiliitis (CMS-HCC)    Skin lesion    Smoking greater than 30 pack years    Status post left hip replacement    Stress reaction    Thigh numbness    Back pain    Greater trochanteric bursitis of left hip    Low back pain    Trochanteric bursitis    Vitamin D deficiency    Primary localized osteoarthritis of knee    Arthralgia of hip    Lower back pain   [2]   Past Medical History:  Diagnosis Date    Personal history of other diseases of the nervous system and sense organs 10/17/2016    History of acute otitis media    Personal history of other diseases of the respiratory system 12/19/2019    History of sinusitis    Trochanteric bursitis, unspecified hip 01/07/2014    Trochanteric bursitis    Urinary tract infection, site not specified 12/11/2015    Bacterial UTI   [3]   Past Surgical History:  Procedure Laterality Date    COLONOSCOPY  11/12/2012    Complete Colonoscopy    FOOT SURGERY  03/04/2016    Foot Surgery    GALLBLADDER SURGERY  11/12/2012    Gallbladder Surgery    HIP SURGERY  01/07/2014    Hip Surgery    HIP SURGERY  01/07/2013    Hip Surgery    HIP SURGERY  01/07/2013    Hip Surgery    OTHER SURGICAL HISTORY  06/24/2021    Endometrial ablation    XR CHEST PACEMAKER WITH FLUORO  10/31/2012    XR CHEST PACEMAKER WITH FLUORO 10/31/2012 [facility]   [4]   Current Outpatient Medications:     alendronate (Fosamax) 70 mg tablet, Take 1 tablet (70 mg) by mouth every 7 days. TAKE 1 TABLET BY MOUTH EVERY WEEK 30 TO 60 MINUTES PRIOR TO BREAKFASTON AN EMPTY STOMACH. DO NOT LIE DOWN AFTER TAKING  MEDICATION, Disp: 12 tablet, Rfl: 0    ibuprofen 800 mg tablet, Take 1 tablet (800 mg) by mouth every 8 hours if needed for mild pain (1 - 3)., Disp: , Rfl:     methocarbamol (Robaxin) 500 mg tablet, Take 1-2 tablets every 8 hours as needed for back pain, Disp: 40 tablet, Rfl: 0    predniSONE (Deltasone) 5 mg tablet, Take 5 tabs (25 mg) by mouth daily for 2 days, then 4 tabs (20 mg) daily for 2 days, then 3 tabs (15 mg) daily for 2 days, then 2 tabs (10 mg) daily for 2 days, then 1 tab (5 mg) daily for 2 days., Disp: 30 tablet, Rfl: 0    predniSONE (Deltasone) 5 mg tablet, Take 5 tabs (25 mg) by mouth daily for 2 days, then 4 tabs (20 mg) daily for 2 days, then 3 tabs (15 mg) daily for 2 days, then 2 tabs (10 mg) daily for 2 days, then 1 tab (5 mg) daily for 2 days., Disp: 30 tablet, Rfl: 0    pregabalin (Lyrica) 75 mg capsule, Take 1 capsule (75 mg) by mouth 2 times a day., Disp: 60 capsule, Rfl: 3    traZODone (Desyrel) 100 mg tablet, Take 1 tablet (100 mg) by mouth once daily at bedtime., Disp: 90 tablet, Rfl: 3  [5] No Known Allergies

## 2025-05-07 ENCOUNTER — APPOINTMENT (OUTPATIENT)
Dept: OBSTETRICS AND GYNECOLOGY | Facility: CLINIC | Age: 65
End: 2025-05-07
Payer: COMMERCIAL

## 2025-05-07 VITALS
HEIGHT: 64 IN | DIASTOLIC BLOOD PRESSURE: 76 MMHG | SYSTOLIC BLOOD PRESSURE: 116 MMHG | WEIGHT: 204 LBS | BODY MASS INDEX: 34.83 KG/M2

## 2025-05-07 DIAGNOSIS — Z00.00 HEALTHCARE MAINTENANCE: ICD-10-CM

## 2025-05-07 DIAGNOSIS — Z12.4 CERVICAL CANCER SCREENING: ICD-10-CM

## 2025-05-07 DIAGNOSIS — F17.200 TOBACCO DEPENDENCE: Primary | ICD-10-CM

## 2025-05-07 DIAGNOSIS — N81.10 CYSTOCELE WITHOUT UTERINE PROLAPSE: ICD-10-CM

## 2025-05-07 PROCEDURE — 99406 BEHAV CHNG SMOKING 3-10 MIN: CPT | Performed by: OBSTETRICS & GYNECOLOGY

## 2025-05-07 PROCEDURE — 3074F SYST BP LT 130 MM HG: CPT | Performed by: OBSTETRICS & GYNECOLOGY

## 2025-05-07 PROCEDURE — 87626 HPV SEP HI-RSK TYP&POOL RSLT: CPT

## 2025-05-07 PROCEDURE — 99203 OFFICE O/P NEW LOW 30 MIN: CPT | Performed by: OBSTETRICS & GYNECOLOGY

## 2025-05-07 PROCEDURE — 4004F PT TOBACCO SCREEN RCVD TLK: CPT | Performed by: OBSTETRICS & GYNECOLOGY

## 2025-05-07 PROCEDURE — 3078F DIAST BP <80 MM HG: CPT | Performed by: OBSTETRICS & GYNECOLOGY

## 2025-05-07 PROCEDURE — 3008F BODY MASS INDEX DOCD: CPT | Performed by: OBSTETRICS & GYNECOLOGY

## 2025-05-07 ASSESSMENT — PAIN SCALES - GENERAL: PAINLEVEL_OUTOF10: 0-NO PAIN

## 2025-05-07 NOTE — PROGRESS NOTES
"Subjective   Patient ID: Esmer Upton is a 64 y.o. female who presents for New Patient Visit (NPV=  Vaginal lump//LAST PAP:  06/24/21- WNL -HPV/LAST MAMM:  12/2024//Chaperone Declined: CORNELL Razo/).Subjective   Patient ID: Esmer Upton is a 64 y.o. female who presents for New Patient Visit (NPV=  Vaginal lump//LAST PAP:  06/24/21- WNL -HPV/LAST MAMM:  12/2024//Chaperone Declined: CORNELL Razo/).  History of Present Illness  Esmer Upton is a 64 year old female who presents with a bulge in the vaginal area.    She experiences a bulge in the vaginal area that varies in size with activity level, becoming more prominent during extended periods on her feet, such as during night shifts at work, and diminishing with relaxation. The sensation is uncomfortable but not painful, and she can feel the bulge externally when it is more pronounced. No associated bleeding or urinary issues.    Her past medical history includes a left hip replacement in March 2023, multiple fractures on the left side, including a small fracture in her knee and foot, which were too minor for surgical intervention. She also had a hip fracture in 2012 that was pinned and later required replacement. Other surgeries include foot surgery in 2016 and gallbladder removal in 2012. She underwent endometrial ablation approximately 20 years ago, which successfully resolved her bleeding issues.    Her current medications include Fosamax, methocarbamol as needed, Lyrica, and trazodone. She has no known allergies.    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 She smokes tobacco, approximately three to five cigarettes a day, and is attempting to quit. She is not currently sexually active and has been in a long-term relationship for 27 years, although she does not live together. She has two children, both born via vaginal delivery.    Review of Systems    Objective     /76   Ht 1.626 m (5' 4\")   Wt " 92.5 kg (204 lb)   BMI 35.02 kg/m²     Physical Exam  Vitals reviewed.   Constitutional:       Appearance: Normal appearance. She is obese.   Genitourinary:     General: Normal vulva.      Exam position: Lithotomy position.      Vagina: Normal.      Cervix: Normal.      Uterus: Normal.       Adnexa: Right adnexa normal and left adnexa normal.      Comments: Bladder, to introitus with valsalva.  Neurological:      Mental Status: She is alert.           Physical Exam  GENITOURINARY: Pelvic organ prolapse present. Bladder prolapse present, no rectal prolapse. Uterus is not prolapsed.    Assessment & Plan  Pelvic organ prolapse  Pelvic organ prolapse primarily involving the bladder (cystocele) with no significant uterine or rectal involvement. Symptoms include a bulge that worsens with physical activity and resolves with rest. No urinary incontinence or bleeding. Common in women with vaginal deliveries and may be exacerbated by heavy lifting.  - Provide information on pelvic organ prolapse and treatment options.  - Schedule pessary fitting to support the bladder and alleviate symptoms.    Tobacco use  Chronic tobacco use with current consumption of 3-5 cigarettes per day. Previous unsuccessful attempt to quit using Chantix. Considering acupuncture as a cessation aid and not using nicotine replacement therapy.  - Discuss acupuncture as a potential aid for smoking cessation.  - Offer referral to  acupuncture program for smoking cessation support.  I spent 30 minutes on the calendar day of the encounter, including pre and post visit work.    Results        Rachel Luna MD     This medical note was created with the assistance of artificial intelligence (AI) for documentation purposes. The content has been reviewed and confirmed by the healthcare provider for accuracy and completeness. Patient consented to the use of audio recording and use of AI during their visit.

## 2025-05-14 ENCOUNTER — TELEPHONE (OUTPATIENT)
Facility: CLINIC | Age: 65
End: 2025-05-14
Payer: COMMERCIAL

## 2025-05-14 NOTE — TELEPHONE ENCOUNTER
----- Message from Clayton Blanco sent at 5/12/2025  4:15 PM EDT -----  Regarding: X-ray results  X-ray lumbar spine with bending without any change in alignment though does show significant facet joint arthritis.  Will see what upcoming MRI shows as well.    ----- Message -----  From: Interface, Radiology Results In  Sent: 5/3/2025  11:04 AM EDT  To: Clayton Blanco PA-C

## 2025-05-15 ENCOUNTER — HOSPITAL ENCOUNTER (OUTPATIENT)
Dept: RADIOLOGY | Facility: CLINIC | Age: 65
Discharge: HOME | End: 2025-05-15
Payer: COMMERCIAL

## 2025-05-15 ENCOUNTER — OFFICE VISIT (OUTPATIENT)
Dept: ORTHOPEDIC SURGERY | Facility: CLINIC | Age: 65
End: 2025-05-15
Payer: COMMERCIAL

## 2025-05-15 DIAGNOSIS — M25.511 ACUTE PAIN OF RIGHT SHOULDER: ICD-10-CM

## 2025-05-15 DIAGNOSIS — M19.011 ARTHRITIS OF RIGHT SHOULDER: ICD-10-CM

## 2025-05-15 DIAGNOSIS — M75.121 COMPLETE TEAR OF RIGHT ROTATOR CUFF, UNSPECIFIED WHETHER TRAUMATIC: ICD-10-CM

## 2025-05-15 DIAGNOSIS — M87.00 AVASCULAR NECROSIS (MULTI): ICD-10-CM

## 2025-05-15 PROCEDURE — 73030 X-RAY EXAM OF SHOULDER: CPT | Mod: RT

## 2025-05-15 PROCEDURE — 99212 OFFICE O/P EST SF 10 MIN: CPT | Performed by: FAMILY MEDICINE

## 2025-05-15 RX ORDER — NAPROXEN 500 MG/1
500 TABLET ORAL
Qty: 28 TABLET | Refills: 0 | Status: SHIPPED | OUTPATIENT
Start: 2025-05-15 | End: 2025-05-29

## 2025-05-15 RX ORDER — HYDROCODONE BITARTRATE AND ACETAMINOPHEN 5; 325 MG/1; MG/1
1 TABLET ORAL EVERY 8 HOURS PRN
Qty: 20 TABLET | Refills: 0 | Status: SHIPPED | OUTPATIENT
Start: 2025-05-15 | End: 2025-05-22

## 2025-05-16 ENCOUNTER — APPOINTMENT (OUTPATIENT)
Dept: RADIOLOGY | Facility: CLINIC | Age: 65
End: 2025-05-16
Payer: COMMERCIAL

## 2025-05-16 LAB
CYTOLOGY CMNT CVX/VAG CYTO-IMP: NORMAL
HPV HR 12 DNA GENITAL QL NAA+PROBE: NEGATIVE
HPV HR GENOTYPES PNL CVX NAA+PROBE: NEGATIVE
HPV16 DNA SPEC QL NAA+PROBE: NEGATIVE
HPV18 DNA SPEC QL NAA+PROBE: NEGATIVE
LAB AP HPV GENOTYPE QUESTION: YES
LAB AP HPV HR: NORMAL
LABORATORY COMMENT REPORT: NORMAL
PATH REPORT.TOTAL CANCER: NORMAL

## 2025-05-16 NOTE — PROGRESS NOTES
Acute Injury New Patient Visit  Assessment & Plan  Right shoulder pain with possible rotator cuff tear and arthritis  Right shoulder pain with limited range of motion and significant pain. X-rays suggest possible arthritis and bone crumbling. Differential includes rotator cuff tear, accelerated arthritis, or small fracture. Previous rotator cuff surgery 20 years ago. MRI necessary for diagnosis confirmation and damage assessment.  - Order MRI of the right shoulder to assess for rotator cuff tear, bony architecture, and possible fracture.  - Provide a sling for comfort and to reduce weight on the shoulder.  - Prescribe Norco for pain management.  - Prescribe naproxen for two weeks for anti-inflammatory effects.  - Provide a light duty work note for four weeks, with possible extension based on MRI results and treatment response.    Possible avascular necrosis of right shoulder  Possible avascular necrosis considered due to x-ray showing crumbling and possible internal bone breakdown. MRI needed for diagnosis confirmation and bone involvement assessment.  - Include potential avascular necrosis as a diagnosis for MRI approval.  Orders Placed This Encounter    XR shoulder right 2+ views    MR shoulder right wo IV contrast    naproxen (Naprosyn) 500 mg tablet    HYDROcodone-acetaminophen (Norco) 5-325 mg tablet     Procedures   At the conclusion of the visit there were no further questions by the patient/family regarding their plan of care.  Patient was instructed to call or return with any issues, questions, or concerns regarding their injury and/or treatment plan described above.    PHYSICAL EXAM:  General:  Patient is awake, alert, and oriented to person place and time.  Patient appears well nourished and well kept.  Affect Calm, Not Acutely Distressed.  Heent:  Normocephalic, Atraumatic, EOMI  Cardiovascular:  Hemodynamically stable.  Respiratory:  Normal respirations with unlabored breathing.  Neuro: Gross  sensation intact to the upper extremities bilaterally.  Extremity: Right shoulder exam as below  Physical Exam  MUSCULOSKELETAL: Right shoulder forward flexion limited to 125 degrees with pain, lateral abduction to 75 degrees, very limited internal rotation, external rotation to the base of the skull. No pain over biceps, minimal discomfort at right AC joint, no significant pain over right subacromial bursa. Moderate crepitus throughout right shoulder. Positive Neer's, Stock, Kershaw's, and supraspinatus tests; negative Speed's and Yergason's tests.  strength equal and symmetric. Compartment soft and compressible.    IMAGING:   Results  RADIOLOGY  Right shoulder X-ray: Moderate osteoarthritis, possible fragmentation of the humeral head, no significant changes on the left side.  XR shoulder right 2+ views  Narrative: Interpreted By:  Budinsky, Cole,   STUDY:  XR SHOULDER RIGHT 2+ VIEWS; ;  5/15/2025 10:20 am      INDICATION:  Signs/Symptoms:pain.      ACCESSION NUMBER(S):  [id number]      ORDERING CLINICIAN:  COLE BUDINSKY      Impression: Right shoulder films demonstrate degenerative changes about the  medial portion of the humeral head. Question presence for AVN versus  chondral defect with concern for subtle lucency appreciated on the  axillary view. The glenohumeral joint is otherwise well situated  without presence for dislocation or subluxation. No significant AC  arthritic changes seen. The glenohumeral joint space otherwise  appears to be quite well-preserved.          Signed by: Cole Budinsky 5/15/2025 12:58 PM  Dictation workstation:   [id number]      Patient ID: Esmer Upton is a 64 y.o. female who presents for Pain of the Right Shoulder.  History of Present Illness  Esmer Upton is a 64 year old female who presents with right shoulder pain and limited mobility.    She has been experiencing severe right shoulder pain for the past few weeks, which has progressively worsened. The pain  is intense, with an inability to lift her arm. No recent trauma or specific incident is noted that could have caused the pain.    The pain is exacerbated by certain movements, such as reaching up or behind her back, and it is painful to wash her hair. Tingling sensations are present in the shoulder area. The pain has become so severe that she was unable to move her arm upon waking up to go to work.    She has a history of arthroscopic surgery on the shoulder for a rotator cuff tear approximately twenty years ago. The current pain feels very different and worse compared to her previous rotator cuff injury, which caused locking rather than pain.    She has been taking Flexeril as a muscle relaxer, but it has not alleviated her symptoms. She recently completed two courses of prednisone for inflammation, which did not resolve her shoulder pain.    No diabetes or blood sugar issues. She is currently working but has difficulty due to her shoulder pain, and her workplace does not accommodate light duty. She is concerned about her ability to continue working given her current condition.        This medical note was created with the assistance of artificial intelligence (AI) for documentation purposes. The content has been reviewed and confirmed by the healthcare provider for accuracy and completeness. Patient consented to the use of audio recording and use of AI during their visit.   05/15/25 at 11:06 PM - Cole C Budinsky, MD  Office:  [phone]

## 2025-05-19 ENCOUNTER — APPOINTMENT (OUTPATIENT)
Dept: OBSTETRICS AND GYNECOLOGY | Facility: CLINIC | Age: 65
End: 2025-05-19
Payer: COMMERCIAL

## 2025-05-19 VITALS
HEIGHT: 64 IN | SYSTOLIC BLOOD PRESSURE: 120 MMHG | BODY MASS INDEX: 34.83 KG/M2 | DIASTOLIC BLOOD PRESSURE: 78 MMHG | WEIGHT: 204 LBS

## 2025-05-19 DIAGNOSIS — N81.10 CYSTOCELE WITHOUT UTERINE PROLAPSE: Primary | ICD-10-CM

## 2025-05-19 PROCEDURE — 99212 OFFICE O/P EST SF 10 MIN: CPT | Performed by: NURSE PRACTITIONER

## 2025-05-19 ASSESSMENT — PAIN SCALES - GENERAL: PAINLEVEL_OUTOF10: 0-NO PAIN

## 2025-05-19 NOTE — PROGRESS NOTES
Esmer Upton is a 64 y.o. referred by Dr. Luna for POP management and consideration of a pessary.     Chief Complaint: prolapse, she wants a pessary     OB/GYN History:   -   - Number of prior vaginal deliveries: 2, largest baby 7#   Number of prior operative deliveries: Forceps potentially, pt is unsure   - Menopausal: Yes           Postmenopausal bleeding: No  - Pap up to date: Yes - 25 NILM, negative HRHPV   - Sexually active:  No, not since her L hip replacement in   - S/p endometrial ablation x20 years ago.     Prolapse symptoms:   - reports prolapse symptoms of a bulge  - denies sense of incomplete bladder or rectal emptying  - POP is more noticeable after she's been on her feet all day or if she is straining with defecation   - she's noticed the prolapse for a few months.     Urinary symptoms:   - KALINA: No  - UUI: No  - Frequency: No  - Nocturia: No  - 0 culture proven UTIs in past year     Bowels  - BMs daily, reports soft and easy to pass    Health Maintenance  - Mammogram up to date: Yes - 24 BI RADS 1  - Colonoscopy up to date: Yes - 22, repeat in 3 years     Past medical and surgical hx reviewed - pertinent for HTN, anxiety, COPD, insomnia, tobacco dependence     Physical Exam  Constitutional:       General: She is not in acute distress.     Appearance: Normal appearance. She is normal weight.   Genitourinary:      Urethral meatus normal.      Right Labia: No rash or lesions.     Left Labia: No lesions or rash.     Cervix is not absent.      Uterus is not absent.  POP-Q measurements were:      Aa: 0, Ba: C: -7     gH: 3, pB: TVL: 10     Ap: -3, Bp: D:   HENT:      Head: Normocephalic and atraumatic.   Pulmonary:      Effort: Pulmonary effort is normal.   Neurological:      Mental Status: She is alert.   Psychiatric:         Mood and Affect: Mood normal.         Behavior: Behavior normal.         Thought Content: Thought content normal.         Judgment: Judgment normal.      Patient ID: Esmer Upton is a 64 y.o. female.    Pessary    Date/Time: 5/19/2025 8:45 AM    Performed by: CY Ibrahim  Authorized by: CY Ibrahim    Consent:     Consent given by:  Patient  Indication:     Indication for pessary: cystocele    Procedure:     Pessary type:  Ring w/ support    Pessary size:  3  Outcomes:     How did patient test pessary?:  Valsalva and voiding    Patient tolerance of procedure:  Tolerated well, no immediate complications    Assessment and Plan  64 y.o. female being assessed for cystocele. Co morbidities: HTN, anxiety, COPD, insomnia, tobacco dependence.     Diagnoses:   #1 Cystocele     Plan:   1. Cystocele    - Discussed that pelvic organ prolapse is a common and benign process in women, and does not require treatment unless it becomes bothersome or causes incomplete bladder emptying.   - Treatment options include PFPT, pessary use, and surgery.   - Patient plans to proceed with a pessary fitting.   - We discussed the benefits of fitting her with a pessary, which is a small flexible silicone ring that is placed intravaginally to help support the pelvic organs to prevent the symptomatic vaginal bulge. She may learn how to manage the pessary at home on her own with taking the pessary in/out prior to intercourse or she may RTC q 3 months for us to perform her pessary maintenance.   - Fit with #3 ring with support, planning for in office maintenance q3 months     Follow-up in 1-2 weeks for an initial pessary check with CY Ibrahim.     Scribe Attestation:   IBrigid, am scribing for virtually, and in the presence of CY Ibrahim on 05/19/2025 at 10:09 AM.     I, Lori Gross, personally performed the services described in the documentation as scribed in my presence and confirm it is both complete and accurate.

## 2025-05-19 NOTE — PATIENT INSTRUCTIONS
"To reach the Urogynecology nurses for Dr. Agee and Lori Gross, call [phone]. You will then select option 2 to be connected to the your provider's office and then option 3 for \"[facility] Urogyn or Kristopher\". This will connect you with Sophie or Dana Bender.   "

## 2025-05-28 ENCOUNTER — HOSPITAL ENCOUNTER (OUTPATIENT)
Dept: RADIOLOGY | Facility: CLINIC | Age: 65
Discharge: HOME | End: 2025-05-28
Payer: COMMERCIAL

## 2025-05-28 DIAGNOSIS — M75.121 COMPLETE TEAR OF RIGHT ROTATOR CUFF, UNSPECIFIED WHETHER TRAUMATIC: ICD-10-CM

## 2025-05-28 DIAGNOSIS — M19.011 ARTHRITIS OF RIGHT SHOULDER: ICD-10-CM

## 2025-05-28 PROCEDURE — 73221 MRI JOINT UPR EXTREM W/O DYE: CPT | Mod: RT

## 2025-06-02 ENCOUNTER — PROCEDURE VISIT (OUTPATIENT)
Dept: OBSTETRICS AND GYNECOLOGY | Facility: CLINIC | Age: 65
End: 2025-06-02
Payer: COMMERCIAL

## 2025-06-02 ENCOUNTER — APPOINTMENT (OUTPATIENT)
Dept: OBSTETRICS AND GYNECOLOGY | Facility: CLINIC | Age: 65
End: 2025-06-02
Payer: COMMERCIAL

## 2025-06-02 VITALS
WEIGHT: 204 LBS | SYSTOLIC BLOOD PRESSURE: 116 MMHG | HEIGHT: 64 IN | DIASTOLIC BLOOD PRESSURE: 64 MMHG | BODY MASS INDEX: 34.83 KG/M2

## 2025-06-02 DIAGNOSIS — N81.10 CYSTOCELE WITHOUT UTERINE PROLAPSE: Primary | ICD-10-CM

## 2025-06-02 DIAGNOSIS — N39.3 SUI (STRESS URINARY INCONTINENCE, FEMALE): ICD-10-CM

## 2025-06-02 PROCEDURE — A4562 PESSARY, NON RUBBER,ANY TYPE: HCPCS | Performed by: NURSE PRACTITIONER

## 2025-06-02 PROCEDURE — 99213 OFFICE O/P EST LOW 20 MIN: CPT | Performed by: NURSE PRACTITIONER

## 2025-06-02 ASSESSMENT — PAIN SCALES - GENERAL: PAINLEVEL_OUTOF10: 0-NO PAIN

## 2025-06-02 NOTE — PROGRESS NOTES
Pessary Check, initial follow up after fitting     This is a 65 y.o. with a #3 ring with support pessary here for an initial pessary check.     Date of fittin25     Today she reports:  Pessary comfortable: Yes  Vaginal bleeding:No  Abnormal vaginal discharge:No  Using vaginal estrogen:No  The patient reports no changes in bladder function, with leaking remaining about the same.   She says her leaking is mainly due to waiting too long to void. She has occasional KALINA with a deep cough.     Exam:  Physical Exam  Constitutional:       General: She is not in acute distress.     Appearance: Normal appearance. She is normal weight. She is not ill-appearing.   Genitourinary:      Vulva normal.      No lesions in the vagina.      No vaginal erythema, bleeding, ulceration or granulation tissue.   Pulmonary:      Effort: Pulmonary effort is normal.   Neurological:      General: No focal deficit present.      Mental Status: She is alert and oriented to person, place, and time.   Psychiatric:         Mood and Affect: Mood normal.         Behavior: Behavior normal.         Thought Content: Thought content normal.         Judgment: Judgment normal.   Vitals reviewed.     Procedure:   Pessary position on presentation: Normal  Pessary removed and cleaned without difficulty  Speculum exam: Vaginal mucosa was examined for the presence of irritation, trauma and/or bleeding   Erosions: No   Vaginal atrophy: Yes   Silver nitrate applied :No  Pessary replaced without difficulty. We removed the #3 ring with support and replaced it with a #3 ring with support and knob.     Assessment/Plan:  Esmer Upton is a 65 y.o. being treated for cystocele. Co morbidities: HTN, anxiety, COPD, insomnia, tobacco dependence.     - The patient is satisfied with pessaries and plans to continue use.   - Risks, alternative and routine maintenance reviewed with patient.  - No need for vaginal estrogen therapy at this time.  - Patient was taught  how to remove and replace the pessary today in clinic. Use some lubricant when she removes the pessary. Clean the pessary with mild soap and water. When she removes the pessary, leave the pessary out overnight and replace it the next day.   - We removed the #3 ring with support and replaced it with a #3 ring with support and knob to address both the POP and KALINA.     She will return to the office in 3 months with Dr. Agee for recheck or sooner should she have any problems.    All questions and concerns were answered and addressed.    Scribe Attestation:   IBrigid, am scribing for virtually, and in the presence of Lori Gross, CY on 06/02/2025 at 11:33 AM.     I, Lori Gross, personally performed the services described in the documentation as scribed in my presence and confirm it is both complete and accurate.

## 2025-06-03 NOTE — PATIENT INSTRUCTIONS
"To reach the Urogynecology nurses for Dr. Agee and Lori Gross, call [phone]. You will then select option 2 to be connected to the your provider's office and then option 3 for \"[facility] Urogyn or Kristopher\". This will connect you with Sophie or Dana Bender.   "

## 2025-06-04 ENCOUNTER — OFFICE VISIT (OUTPATIENT)
Dept: ORTHOPEDIC SURGERY | Facility: CLINIC | Age: 65
End: 2025-06-04
Payer: COMMERCIAL

## 2025-06-04 DIAGNOSIS — S42.191G: ICD-10-CM

## 2025-06-04 DIAGNOSIS — M95.8 OSTEOCHONDRAL DEFECT OF CONDYLE OF FEMUR: ICD-10-CM

## 2025-06-04 DIAGNOSIS — M81.0 OSTEOPOROSIS, UNSPECIFIED OSTEOPOROSIS TYPE, UNSPECIFIED PATHOLOGICAL FRACTURE PRESENCE: ICD-10-CM

## 2025-06-04 PROCEDURE — 99214 OFFICE O/P EST MOD 30 MIN: CPT | Performed by: FAMILY MEDICINE

## 2025-06-04 PROCEDURE — 99212 OFFICE O/P EST SF 10 MIN: CPT | Performed by: FAMILY MEDICINE

## 2025-06-04 RX ORDER — NAPROXEN 500 MG/1
500 TABLET ORAL 2 TIMES DAILY
Qty: 60 TABLET | Refills: 2 | Status: SHIPPED | OUTPATIENT
Start: 2025-06-04 | End: 2025-09-02

## 2025-06-04 RX ORDER — HYDROCODONE BITARTRATE AND ACETAMINOPHEN 5; 325 MG/1; MG/1
1 TABLET ORAL EVERY 8 HOURS PRN
Qty: 15 TABLET | Refills: 0 | Status: SHIPPED | OUTPATIENT
Start: 2025-06-04 | End: 2025-06-09

## 2025-06-04 RX ORDER — ALENDRONATE SODIUM 70 MG/1
70 TABLET ORAL
Qty: 8 TABLET | Refills: 0 | Status: SHIPPED | OUTPATIENT
Start: 2025-06-04 | End: 2025-08-03

## 2025-06-04 NOTE — LETTER
June 4, 2025     Patient: Esmer Upton   YOB: 1960   Date of Visit: 6/4/2025       To Whom It May Concern:    It is my medical opinion that Esmer Upton may return to work on 6/10/2025 with no restrictions.    If you have any questions or concerns, please don't hesitate to call [phone].         Sincerely,        Cole C Budinsky, MD

## 2025-06-05 NOTE — PROGRESS NOTES
Follow-Up Patient Visit: Upper Extremity Injury  Assessment & Plan  Right shoulder rotator cuff tendonitis  Chronic tendonitis without rotator cuff tear causing pain and tenderness.  - Follow-up with shoulder specialist for potential interventions such as injections.  - Injection deferred here today due to Spine fracture and concern for chondral defect about the humeral head.    Subacute fracture of scapular spine  Non-displaced subacute fracture with bone marrow edema and periosteal reaction indicating healing.  - Advise rest and avoidance of exacerbating activities.  - Follow-up with shoulder specialist for further evaluation.    Bone marrow edema in shoulder  Edema associated with subacute scapular spine fracture indicating swelling and healing.  - Advise rest and avoidance of exacerbating activities.  - Follow-up with shoulder specialist for further evaluation.    Cartilage damage in shoulder joint  Irregularity and thinning of cartilage likely due to wear and tear.  - Follow-up with shoulder specialist to evaluate cartilage damage and discuss treatment options.  Discussed with the patient that this may or may not require surgical intervention pending the    Recurrent fractures  Multiple fractures raise concern about bone fragility. Further evaluation by endocrinologist needed. Bone density scan required.  - Order bone density scan (DEXA) to assess bone health.  - Refer to endocrinologist for evaluation of bone fragility and recurrent fractures.  - Follow-up with rheumatologist for ongoing management.    Follow-up  Comprehensive management of shoulder condition and bone health discussed. Return to work planned with accommodations.  - Schedule follow-up with Dr. Schaeffer, shoulder specialist, in 1-2 weeks.  - Provide return to work note for full duty starting Tuesday.  - Prescribe 90-day supply of naproxen for pain management.  - Prescribe 14 tablets of Norco for pain management as needed.  - Coordinate endocrine  referral and bone density scan through chart.    Orders Placed This Encounter    NM bone 3 phase    Referral to Endocrinology    naproxen (Naprosyn) 500 mg tablet    HYDROcodone-acetaminophen (Norco) 5-325 mg tablet     1. Osteochondral defect of condyle of femur    2. Closed fracture of spine of right scapula with delayed healing, subsequent encounter      Procedures  At the conclusion of the visit there were no further questions by the patient/family regarding their plan of care.  Patient was instructed to call or return with any issues, questions, or concerns regarding their injury and/or treatment plan described above.    PHYSICAL EXAM:  Neuro: Gross sensation intact to the upper extremities bilaterally.  Upper Extremity: Right shoulder demonstrates moderate crepitus with improvement in range of motion today forward flexion to about 110 degrees lateral abduction to 75 limited internal and external rotation is still noted.  Positive Neer's Stock and Aragon's, negative speeds and Yergason's Test.  Forearm compartment soft compressible no pain about the elbow forearm or wrist.   strength is intact.  Tenderness palpation over the scapular spine and medial border of the scapula with some rhomboid and trapezius tenderness.  There is no pain about the neck.  Physical Exam        IMAGING:   Results  RADIOLOGY  Shoulder MRI: Rotator cuff tendinitis, subacute fracture at the base of the scapular spine, bone marrow edema, periosteal reaction, irregularity with cartilage thinning in the shoulder joint  MR shoulder right wo IV contrast  Narrative: Interpreted By:  Bryan Oden,   STUDY:  MRI of the  right shoulder without IV contrast;  5/28/2025 5:07 pm      INDICATION:  Signs/Symptoms:pain.      ,M19.011 Primary osteoarthritis, right shoulder,M75.121 Complete  rotator cuff tear or rupture of right shoulder, not specified as  traumatic      COMPARISON:  05/15/2025      ACCESSION NUMBER(S):  [id number]      ORDERING  CLINICIAN:  COLE BUDINSKY      TECHNIQUE:  MR imaging of the  right shoulder was obtained  without IV contrast.      FINDINGS:  ROTATOR CUFF TENDONS:  There is mild supraspinatus and infraspinatus tendinosis. The  subscapularis tendon is intact. Teres minor tendon is intact. There  is no edema or fatty atrophy of the rotator cuff musculature.      BICEPS TENDON AND ROTATOR INTERVAL:  The intra-articular long head biceps tendon is intact and has a  normal course. The rotator interval is unremarkable.      JOINTS:  Distal clavicular resection.      Mild glenohumeral articular cartilage thinning diffusely. There is  more moderate thinning of the humeral head within area of  osteochondral irregularity of the humeral head measuring  approximately 10 x 10 mm. There is subjacent bone marrow edema.      No evidence of significant joint effusion. No significant bursal  fluid collection.      LABRUM:  The labrum is grossly unremarkable given the limitations of a  non-arthrographic study.      OSSEOUS STRUCTURES:  There is marrow edema with the scapular spine meets the scapular  body. There is periosteal reaction and callus formation. There is a  fracture line is visualized.      SOFT TISSUES:  The suprascapular nerve is intact at the suprascapular and  spinoglenoid notches.      Impression: Subacute fracture at the base of the scapular spine with adjacent  periosteal edema and callus formation.      Osteochondral irregularity of the humeral head medially with mild  diffuse articular cartilage thinning.      Mild supraspinatus and infraspinatus tendinosis without tear.      MACRO:  None      Signed by: Bryan Oden 5/28/2025 6:37 PM  Dictation workstation:   [id number]       HPI  Patient ID: Esmer Upton is a 65 y.o. female who presents for Follow-up of the Right Shoulder (MRI results).  History of Present Illness  Esmer Upton is a 65 year old female who presents with shoulder pain and possible fractures.    She  experiences persistent shoulder pain, which worsens with movement, particularly when lifting her arm. The pain is present even when sitting still but intensifies upon touch or movement. The onset of pain occurred after reaching up at work, and by the next morning, she was unable to lift her arm.    An MRI revealed a subacute fracture at the base of the scapular spine, with potential additional findings in the shoulder joint. No recent trauma or fall is reported. She has a history of arthroscopic surgery on the shoulder approximately 15 years ago, though the specific reason for the surgery is not recalled.    She has experienced similar unexplained fractures in the past, including a knee fracture, which also occurred without a known injury. Swelling in her hand and fingers occurs when using a sling, leading to its discontinuation.    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 She is currently taking naproxen, which she finds effective for pain management, and inquires about a refill. She also requests a prescription for hydrocodone to manage pain after work, indicating she would use it sparingly.            This medical note was created with the assistance of artificial intelligence (AI) for documentation purposes. The content has been reviewed and confirmed by the healthcare provider for accuracy and completeness. Patient consented to the use of audio recording and use of AI during their visit.   06/05/25 at 9:25 AM - Cole C Budinsky, MD  Office:  [phone]

## 2025-06-13 ENCOUNTER — APPOINTMENT (OUTPATIENT)
Dept: RADIOLOGY | Facility: CLINIC | Age: 65
End: 2025-06-13
Payer: COMMERCIAL

## 2025-06-17 ENCOUNTER — APPOINTMENT (OUTPATIENT)
Dept: HEMATOLOGY/ONCOLOGY | Facility: CLINIC | Age: 65
End: 2025-06-17
Payer: COMMERCIAL

## 2025-06-17 ENCOUNTER — HOSPITAL ENCOUNTER (OUTPATIENT)
Dept: RADIOLOGY | Facility: CLINIC | Age: 65
Discharge: HOME | End: 2025-06-17
Payer: COMMERCIAL

## 2025-06-17 DIAGNOSIS — S42.191G: ICD-10-CM

## 2025-06-17 DIAGNOSIS — M95.8 OSTEOCHONDRAL DEFECT OF CONDYLE OF FEMUR: ICD-10-CM

## 2025-06-17 PROCEDURE — 78315 BONE IMAGING 3 PHASE: CPT

## 2025-06-17 PROCEDURE — 78315 BONE IMAGING 3 PHASE: CPT | Performed by: INTERNAL MEDICINE

## 2025-06-17 PROCEDURE — A9503 TC99M MEDRONATE: HCPCS | Performed by: FAMILY MEDICINE

## 2025-06-17 PROCEDURE — 3430000001 HC RX 343 DIAGNOSTIC RADIOPHARMACEUTICALS: Performed by: FAMILY MEDICINE

## 2025-06-17 RX ADMIN — TECHNETIUM TC 99M MEDRONATE 26.6 MILLICURIE: 25 INJECTION, POWDER, FOR SOLUTION INTRAVENOUS at 10:57

## 2025-06-23 ENCOUNTER — APPOINTMENT (OUTPATIENT)
Dept: ORTHOPEDIC SURGERY | Facility: CLINIC | Age: 65
End: 2025-06-23
Payer: COMMERCIAL

## 2025-06-23 VITALS — BODY MASS INDEX: 34.83 KG/M2 | HEIGHT: 64 IN | WEIGHT: 204 LBS

## 2025-06-23 DIAGNOSIS — M89.9 DISORDER OF SCAPULA: ICD-10-CM

## 2025-06-23 PROCEDURE — 99212 OFFICE O/P EST SF 10 MIN: CPT | Performed by: ORTHOPAEDIC SURGERY

## 2025-06-23 PROCEDURE — 3008F BODY MASS INDEX DOCD: CPT | Performed by: ORTHOPAEDIC SURGERY

## 2025-06-23 PROCEDURE — 4004F PT TOBACCO SCREEN RCVD TLK: CPT | Performed by: ORTHOPAEDIC SURGERY

## 2025-06-23 PROCEDURE — 99213 OFFICE O/P EST LOW 20 MIN: CPT | Performed by: ORTHOPAEDIC SURGERY

## 2025-06-23 NOTE — PROGRESS NOTES
History of Present Illness:   Patient returns today for evaluation of her right shoulder she was seeing Dr. Antunez.  She has an MRI and a bone scan.  She denies any significant pain at rest but does have pain with certain activities.  She endorses the pain is deep denies any recent trauma to the posterior aspect of her shoulder.  She works in [employer]    Review of Systems   GENERAL: Negative for malaise, significant weight loss, fever  MUSCULOSKELETAL: see HPI  NEURO:  Negative    Physical Examination:  Right Shoulder:  Skin healthy to gross inspection  No ecchymosis, no edema, no gross atrophy  No tenderness to palpation over acromioclavicular joint  No tenderness to palpation over biceps tendon  No tenderness to palpation over the cervical spine   Positive tenderness over scapular spine    ROM:  Full forward flexion  Full external rotation  IR symmetric to contralateral side  Strength:  5/5 Resisted elevation  5/5 Resisted external rotation    Negative lift off test   Negative Spurling´s test  Negative Neer and Hawking´s test  Negative Speed's test  Neurovascular exam normal distally      Imaging:  MRI demonstrates evidence of localized AVN of the humeral head, both bone scan and MRI demonstrates some fluid and swelling in the scapular spine    Assessment:   Patient with right shoulder pain, localized AVN humeral head    Plan:  We discussed vitamin D supplementation and activity modification with the patient.  We reviewed there if there is any collapse of the osteochondral segment that she may benefit from humeral head resurfacing/Jacinto.    She does have some evidence of fluid and swelling in her scapular spine and is tender in that location with no history of trauma that would be an atypical location for fracture.  She does have a smoking history will refer to thoracic surgery for evaluation of the scapula.

## 2025-06-26 ENCOUNTER — TELEPHONE (OUTPATIENT)
Dept: PRIMARY CARE | Facility: CLINIC | Age: 65
End: 2025-06-26
Payer: COMMERCIAL

## 2025-06-26 NOTE — TELEPHONE ENCOUNTER
Pt saw a surgeon for her shoulder and was advised to see a thoracic doctor and she would like to know if Dr. Duong has any recommendations. Please advise

## 2025-07-28 ENCOUNTER — TELEPHONE (OUTPATIENT)
Dept: PRIMARY CARE | Facility: CLINIC | Age: 65
End: 2025-07-28
Payer: COMMERCIAL

## 2025-07-28 DIAGNOSIS — Z12.11 COLON CANCER SCREENING: ICD-10-CM

## 2025-07-28 DIAGNOSIS — Z12.11 COLON CANCER SCREENING: Primary | ICD-10-CM

## 2025-07-28 NOTE — TELEPHONE ENCOUNTER
Pt called- Pt has prescription order for coloscopy. Order expires for january 6th, open availability is looking after that. Pt is going to need a new order for colonoscopy. Please advise

## 2025-07-29 RX ORDER — POLYETHYLENE GLYCOL 3350, SODIUM SULFATE, POTASSIUM CHLORIDE, MAGNESIUM SULFATE, AND SODIUM CHLORIDE FOR ORAL SOLUTION 178.7-7.3G
1 KIT ORAL ONCE
Qty: 1 EACH | Refills: 0 | Status: SHIPPED | OUTPATIENT
Start: 2025-07-29 | End: 2025-07-29

## 2025-07-30 ENCOUNTER — HOSPITAL ENCOUNTER (OUTPATIENT)
Dept: RADIOLOGY | Facility: CLINIC | Age: 65
Discharge: HOME | End: 2025-07-30
Payer: COMMERCIAL

## 2025-07-30 ENCOUNTER — OFFICE VISIT (OUTPATIENT)
Dept: ORTHOPEDIC SURGERY | Facility: CLINIC | Age: 65
End: 2025-07-30
Payer: COMMERCIAL

## 2025-07-30 DIAGNOSIS — M17.12 OSTEOARTHRITIS OF LEFT KNEE, UNSPECIFIED OSTEOARTHRITIS TYPE: ICD-10-CM

## 2025-07-30 DIAGNOSIS — Z96.642 STATUS POST LEFT HIP REPLACEMENT: ICD-10-CM

## 2025-07-30 DIAGNOSIS — S72.432A DISPLACED FRACTURE OF MEDIAL CONDYLE OF LEFT FEMUR, INITIAL ENCOUNTER FOR CLOSED FRACTURE: ICD-10-CM

## 2025-07-30 DIAGNOSIS — M17.12 OSTEOARTHRITIS OF LEFT KNEE, UNSPECIFIED OSTEOARTHRITIS TYPE: Primary | ICD-10-CM

## 2025-07-30 PROCEDURE — 73502 X-RAY EXAM HIP UNI 2-3 VIEWS: CPT | Mod: LEFT SIDE | Performed by: RADIOLOGY

## 2025-07-30 PROCEDURE — 20610 DRAIN/INJ JOINT/BURSA W/O US: CPT | Performed by: ORTHOPAEDIC SURGERY

## 2025-07-30 PROCEDURE — 99214 OFFICE O/P EST MOD 30 MIN: CPT | Performed by: ORTHOPAEDIC SURGERY

## 2025-07-30 PROCEDURE — 73562 X-RAY EXAM OF KNEE 3: CPT | Mod: LEFT SIDE | Performed by: RADIOLOGY

## 2025-07-30 PROCEDURE — 73562 X-RAY EXAM OF KNEE 3: CPT | Mod: LT

## 2025-07-30 PROCEDURE — 73502 X-RAY EXAM HIP UNI 2-3 VIEWS: CPT | Mod: LT

## 2025-07-30 RX ORDER — LIDOCAINE HYDROCHLORIDE 20 MG/ML
2 INJECTION, SOLUTION INFILTRATION; PERINEURAL
Status: COMPLETED | OUTPATIENT
Start: 2025-07-30 | End: 2025-07-30

## 2025-07-30 RX ORDER — METHYLPREDNISOLONE ACETATE 40 MG/ML
40 INJECTION, SUSPENSION INTRA-ARTICULAR; INTRALESIONAL; INTRAMUSCULAR; SOFT TISSUE
Status: COMPLETED | OUTPATIENT
Start: 2025-07-30 | End: 2025-07-30

## 2025-07-30 RX ADMIN — METHYLPREDNISOLONE ACETATE 40 MG: 40 INJECTION, SUSPENSION INTRA-ARTICULAR; INTRALESIONAL; INTRAMUSCULAR; SOFT TISSUE at 11:55

## 2025-07-30 RX ADMIN — LIDOCAINE HYDROCHLORIDE 2 ML: 20 INJECTION, SOLUTION INFILTRATION; PERINEURAL at 11:55

## 2025-07-30 NOTE — PROGRESS NOTES
Returns for left leg.  Had a recent bone scan regarding her shoulder.  But here for left knee and thigh pain over the last month.  She got good relief from gel injections by Dr. Budinsky last fall.  No groin pain.  Complains of pain along the distal thigh and knee.  No falls.    Exam: No pain hip logroll.  Tenderness along the thigh to palpation.  No mass.  No swelling.  Crepitus through range of motion of the left knee.    I personally reviewed the following radiographic exams: Bone scan shows some subtle uptake around the proximal lateral femoral cortex near the total hip.  Has uptake in the medial left distal femur as well as throughout the knee joint.  Uptake across the midfoot bilaterally.  Increased uptake around the right patella.  X-ray of left hip shows stable total hip without change from 2023.  No loosening.  X-ray of left knee shows moderate to severe tricompartment arthritis.  Thickening of the medial cortex consistent with her previous insufficiency fracture.    Assessment: Status post left total hip.  Left knee arthrosis.  Healed insufficiency fracture medial distal tibia.    Plan: Discussed nonoperative and operative options in detail.   Risk and benefits discussed in detail. All questions answered today.  Recovery timeline and expectations discussed in detail.  Would like to determine how much of her pain is coming from her knee joint proper.  We will give her a cortisone injection today to see what her relief is but I would certainly apply for the gel injections which gave her good long-term relief last year.  Discussed possible knee replacement.  After informed consent under sterile conditions the left knee was injected with a combination of  2cc 2% lidocaine and 40mg Methylprednisolone. Risks and benefits were discussed in detail.    Patient ID: Esmer Upton is a 65 y.o. female.    L Inj/Asp: L knee on 7/30/2025 11:55 AM  Indications: pain, joint swelling and diagnostic evaluation  Details:  21 G needle, anterolateral approach  Medications: 40 mg methylPREDNISolone acetate 40 mg/mL; 2 mL lidocaine 20 mg/mL (2 %)  Outcome: tolerated well, no immediate complications  Procedure, treatment alternatives, risks and benefits explained, specific risks discussed. Consent was given by the patient. Immediately prior to procedure a time out was called to verify the correct patient, procedure, equipment, support staff and site/side marked as required. Patient was prepped and draped in the usual sterile fashion.

## 2025-08-04 ENCOUNTER — TELEPHONE (OUTPATIENT)
Dept: ORTHOPEDIC SURGERY | Facility: CLINIC | Age: 65
End: 2025-08-04
Payer: COMMERCIAL

## 2025-08-04 NOTE — TELEPHONE ENCOUNTER
Diana from Dr. Dumont's office called to let you know Dr. Dumont only does Ribs, lungs, hernia's  And slipped ribs. Patient was referred for disorder of scapula. Diana states she has scheduled this patient for Wednesday. She would like a call back with a proper diagnosis. [phone].

## 2025-08-06 ENCOUNTER — APPOINTMENT (OUTPATIENT)
Dept: SURGERY | Facility: CLINIC | Age: 65
End: 2025-08-06
Payer: COMMERCIAL

## 2025-08-11 ENCOUNTER — APPOINTMENT (OUTPATIENT)
Dept: LAB | Facility: HOSPITAL | Age: 65
End: 2025-08-11
Payer: COMMERCIAL

## 2025-08-11 ENCOUNTER — APPOINTMENT (OUTPATIENT)
Dept: RHEUMATOLOGY | Facility: CLINIC | Age: 65
End: 2025-08-11
Payer: COMMERCIAL

## 2025-08-11 VITALS
OXYGEN SATURATION: 97 % | HEART RATE: 79 BPM | SYSTOLIC BLOOD PRESSURE: 105 MMHG | DIASTOLIC BLOOD PRESSURE: 69 MMHG | BODY MASS INDEX: 33.99 KG/M2 | WEIGHT: 198 LBS

## 2025-08-11 DIAGNOSIS — M12.9 ARTHROPATHY: ICD-10-CM

## 2025-08-11 DIAGNOSIS — M15.9 OSTEOARTHRITIS OF MULTIPLE JOINTS, UNSPECIFIED OSTEOARTHRITIS TYPE: ICD-10-CM

## 2025-08-11 DIAGNOSIS — M81.0 OSTEOPOROSIS, UNSPECIFIED OSTEOPOROSIS TYPE, UNSPECIFIED PATHOLOGICAL FRACTURE PRESENCE: Primary | ICD-10-CM

## 2025-08-11 LAB — PROT SERPL-MCNC: 6.2 G/DL (ref 6.4–8.2)

## 2025-08-11 PROCEDURE — 99214 OFFICE O/P EST MOD 30 MIN: CPT | Performed by: INTERNAL MEDICINE

## 2025-08-11 PROCEDURE — 3074F SYST BP LT 130 MM HG: CPT | Performed by: INTERNAL MEDICINE

## 2025-08-11 PROCEDURE — 86320 SERUM IMMUNOELECTROPHORESIS: CPT

## 2025-08-11 PROCEDURE — 84155 ASSAY OF PROTEIN SERUM: CPT

## 2025-08-11 PROCEDURE — 84165 PROTEIN E-PHORESIS SERUM: CPT

## 2025-08-11 PROCEDURE — 86334 IMMUNOFIX E-PHORESIS SERUM: CPT

## 2025-08-11 PROCEDURE — 3078F DIAST BP <80 MM HG: CPT | Performed by: INTERNAL MEDICINE

## 2025-08-11 RX ORDER — METHOCARBAMOL 750 MG/1
1 TABLET, FILM COATED ORAL EVERY 8 HOURS PRN
COMMUNITY
Start: 2025-08-04

## 2025-08-11 ASSESSMENT — PATIENT HEALTH QUESTIONNAIRE - PHQ9
SUM OF ALL RESPONSES TO PHQ9 QUESTIONS 1 & 2: 0
2. FEELING DOWN, DEPRESSED OR HOPELESS: NOT AT ALL
1. LITTLE INTEREST OR PLEASURE IN DOING THINGS: NOT AT ALL

## 2025-08-11 ASSESSMENT — ENCOUNTER SYMPTOMS
OCCASIONAL FEELINGS OF UNSTEADINESS: 0
LOSS OF SENSATION IN FEET: 0
DEPRESSION: 0

## 2025-08-11 ASSESSMENT — LIFESTYLE VARIABLES
HOW OFTEN DO YOU HAVE SIX OR MORE DRINKS ON ONE OCCASION: NEVER
HOW OFTEN DO YOU HAVE A DRINK CONTAINING ALCOHOL: NEVER
SKIP TO QUESTIONS 9-10: 1
AUDIT-C TOTAL SCORE: 0
HOW MANY STANDARD DRINKS CONTAINING ALCOHOL DO YOU HAVE ON A TYPICAL DAY: PATIENT DOES NOT DRINK

## 2025-08-11 ASSESSMENT — PAIN SCALES - GENERAL: PAINLEVEL_OUTOF10: 0-NO PAIN

## 2025-08-13 LAB
25(OH)D3+25(OH)D2 SERPL-MCNC: 29 NG/ML (ref 30–100)
ALBUMIN SERPL-MCNC: 4.2 G/DL (ref 3.6–5.1)
ALBUMIN/GLOB SERPL: 1.9 (CALC) (ref 1–2.5)
ALP SERPL-CCNC: 100 U/L (ref 37–153)
ALT SERPL-CCNC: 8 U/L (ref 6–29)
AST SERPL-CCNC: 12 U/L (ref 10–35)
BILIRUB DIRECT SERPL-MCNC: 0.1 MG/DL
BILIRUB INDIRECT SERPL-MCNC: 0.2 MG/DL (CALC) (ref 0.2–1.2)
BILIRUB SERPL-MCNC: 0.3 MG/DL (ref 0.2–1.2)
CA-I SERPL-MCNC: 5.2 MG/DL (ref 4.7–5.5)
CCP IGG SERPL-ACNC: <16 UNITS
COLLAGEN CTX SERPL-MCNC: 485 PG/ML
CREAT SERPL-MCNC: 0.91 MG/DL (ref 0.5–1.05)
EGFRCR SERPLBLD CKD-EPI 2021: 70 ML/MIN/1.73M2
GLOBULIN SER CALC-MCNC: 2.2 G/DL (CALC) (ref 1.9–3.7)
PROT SERPL-MCNC: 6.4 G/DL (ref 6.1–8.1)
PTH-INTACT SERPL-MCNC: 77 PG/ML (ref 16–77)
RHEUMATOID FACT SERPL-ACNC: <10 IU/ML
URATE SERPL-MCNC: 4.9 MG/DL (ref 2.5–7)

## 2025-08-14 LAB
ALBUMIN: 3.8 G/DL (ref 3.4–5)
ALPHA 1 GLOBULIN: 0.3 G/DL (ref 0.2–0.6)
ALPHA 2 GLOBULIN: 0.7 G/DL (ref 0.4–1.1)
BETA GLOBULIN: 0.7 G/DL (ref 0.5–1.2)
GAMMA GLOBULIN: 0.7 G/DL (ref 0.5–1.4)
IMMUNOFIXATION COMMENT: NORMAL
PATH REVIEW - SERUM IMMUNOFIXATION: NORMAL
PATH REVIEW-SERUM PROTEIN ELECTROPHORESIS: NORMAL
PROTEIN ELECTROPHORESIS COMMENT: NORMAL

## 2025-08-18 ENCOUNTER — TELEPHONE (OUTPATIENT)
Dept: PRIMARY CARE | Facility: CLINIC | Age: 65
End: 2025-08-18

## 2025-08-18 ENCOUNTER — APPOINTMENT (OUTPATIENT)
Dept: PRIMARY CARE | Facility: CLINIC | Age: 65
End: 2025-08-18
Payer: COMMERCIAL

## 2025-08-18 VITALS
SYSTOLIC BLOOD PRESSURE: 130 MMHG | DIASTOLIC BLOOD PRESSURE: 80 MMHG | OXYGEN SATURATION: 97 % | RESPIRATION RATE: 18 BRPM | BODY MASS INDEX: 34.06 KG/M2 | HEART RATE: 69 BPM | WEIGHT: 198.4 LBS

## 2025-08-18 DIAGNOSIS — M54.6 BILATERAL THORACIC BACK PAIN, UNSPECIFIED CHRONICITY: Primary | ICD-10-CM

## 2025-08-18 DIAGNOSIS — M81.0 OSTEOPOROSIS, UNSPECIFIED OSTEOPOROSIS TYPE, UNSPECIFIED PATHOLOGICAL FRACTURE PRESENCE: ICD-10-CM

## 2025-08-18 DIAGNOSIS — M15.9 OSTEOARTHRITIS OF MULTIPLE JOINTS, UNSPECIFIED OSTEOARTHRITIS TYPE: ICD-10-CM

## 2025-08-18 PROCEDURE — 3079F DIAST BP 80-89 MM HG: CPT | Performed by: INTERNAL MEDICINE

## 2025-08-18 PROCEDURE — 3075F SYST BP GE 130 - 139MM HG: CPT | Performed by: INTERNAL MEDICINE

## 2025-08-18 PROCEDURE — 99214 OFFICE O/P EST MOD 30 MIN: CPT | Performed by: INTERNAL MEDICINE

## 2025-08-18 RX ORDER — TRAMADOL HYDROCHLORIDE 50 MG/1
50 TABLET, FILM COATED ORAL EVERY 6 HOURS PRN
Qty: 28 TABLET | Refills: 0 | Status: SHIPPED | OUTPATIENT
Start: 2025-08-18 | End: 2025-08-27

## 2025-08-18 ASSESSMENT — ENCOUNTER SYMPTOMS: BACK PAIN: 1

## 2025-08-19 DIAGNOSIS — M81.0 OSTEOPOROSIS, UNSPECIFIED OSTEOPOROSIS TYPE, UNSPECIFIED PATHOLOGICAL FRACTURE PRESENCE: ICD-10-CM

## 2025-08-19 RX ORDER — ALENDRONATE SODIUM 70 MG/1
70 TABLET ORAL
Qty: 12 TABLET | Refills: 0 | Status: SHIPPED | OUTPATIENT
Start: 2025-08-19 | End: 2025-11-17

## 2025-08-21 DIAGNOSIS — M17.12 OSTEOARTHRITIS OF LEFT KNEE, UNSPECIFIED OSTEOARTHRITIS TYPE: ICD-10-CM

## 2025-08-21 RX ORDER — HYALURONATE SODIUM 20 MG/2 ML
20 SYRINGE (ML) INTRAARTICULAR
Qty: 6 ML | Refills: 0 | Status: SHIPPED | OUTPATIENT
Start: 2025-08-21 | End: 2025-09-05

## 2025-09-02 DIAGNOSIS — M54.16 LUMBAR RADICULOPATHY: ICD-10-CM

## 2025-09-02 RX ORDER — PREGABALIN 75 MG/1
75 CAPSULE ORAL 2 TIMES DAILY
Qty: 60 CAPSULE | Refills: 3 | Status: SHIPPED | OUTPATIENT
Start: 2025-09-02

## 2025-09-03 ENCOUNTER — TELEPHONE (OUTPATIENT)
Dept: ORTHOPEDIC SURGERY | Facility: CLINIC | Age: 65
End: 2025-09-03
Payer: COMMERCIAL

## 2025-09-15 ENCOUNTER — APPOINTMENT (OUTPATIENT)
Dept: OBSTETRICS AND GYNECOLOGY | Facility: CLINIC | Age: 65
End: 2025-09-15
Payer: COMMERCIAL

## 2025-10-27 ENCOUNTER — APPOINTMENT (OUTPATIENT)
Dept: PRIMARY CARE | Facility: CLINIC | Age: 65
End: 2025-10-27
Payer: COMMERCIAL

## 2025-12-08 ENCOUNTER — APPOINTMENT (OUTPATIENT)
Dept: GASTROENTEROLOGY | Facility: HOSPITAL | Age: 65
End: 2025-12-08
Payer: COMMERCIAL

## 2025-12-29 ENCOUNTER — APPOINTMENT (OUTPATIENT)
Dept: GASTROENTEROLOGY | Facility: EXTERNAL LOCATION | Age: 65
End: 2025-12-29
Payer: COMMERCIAL

## 2026-06-22 ENCOUNTER — APPOINTMENT (OUTPATIENT)
Dept: RHEUMATOLOGY | Facility: CLINIC | Age: 66
End: 2026-06-22
Payer: COMMERCIAL